# Patient Record
Sex: MALE | Race: BLACK OR AFRICAN AMERICAN | Employment: PART TIME | URBAN - METROPOLITAN AREA
[De-identification: names, ages, dates, MRNs, and addresses within clinical notes are randomized per-mention and may not be internally consistent; named-entity substitution may affect disease eponyms.]

---

## 2018-01-09 ENCOUNTER — HOSPITAL ENCOUNTER (EMERGENCY)
Age: 23
Discharge: HOME | End: 2018-01-09
Attending: EMERGENCY MEDICINE
Payer: SELF-PAY

## 2018-01-09 ENCOUNTER — APPOINTMENT (OUTPATIENT)
Dept: GENERAL RADIOLOGY | Age: 23
End: 2018-01-09
Attending: EMERGENCY MEDICINE
Payer: SELF-PAY

## 2018-01-09 VITALS
RESPIRATION RATE: 22 BRPM | OXYGEN SATURATION: 96 % | BODY MASS INDEX: 36.91 KG/M2 | SYSTOLIC BLOOD PRESSURE: 140 MMHG | HEART RATE: 115 BPM | DIASTOLIC BLOOD PRESSURE: 75 MMHG | WEIGHT: 263.67 LBS | HEIGHT: 71 IN | TEMPERATURE: 103.1 F

## 2018-01-09 DIAGNOSIS — J02.9 ACUTE PHARYNGITIS, UNSPECIFIED ETIOLOGY: Primary | ICD-10-CM

## 2018-01-09 DIAGNOSIS — R53.83 MALAISE AND FATIGUE: ICD-10-CM

## 2018-01-09 DIAGNOSIS — R53.81 MALAISE AND FATIGUE: ICD-10-CM

## 2018-01-09 LAB
ALBUMIN SERPL-MCNC: 3.9 G/DL (ref 3.5–5)
ALBUMIN/GLOB SERPL: 0.8 {RATIO} (ref 1.1–2.2)
ALP SERPL-CCNC: 73 U/L (ref 45–117)
ALT SERPL-CCNC: 43 U/L (ref 12–78)
ANION GAP SERPL CALC-SCNC: 5 MMOL/L (ref 5–15)
APPEARANCE UR: ABNORMAL
AST SERPL-CCNC: 23 U/L (ref 15–37)
BACTERIA URNS QL MICRO: NEGATIVE /HPF
BASOPHILS # BLD: 0 K/UL
BASOPHILS NFR BLD: 0 %
BILIRUB SERPL-MCNC: 0.6 MG/DL (ref 0.2–1)
BILIRUB UR QL CFM: NEGATIVE
BUN SERPL-MCNC: 12 MG/DL (ref 6–20)
BUN/CREAT SERPL: 8 (ref 12–20)
CALCIUM SERPL-MCNC: 9 MG/DL (ref 8.5–10.1)
CHLORIDE SERPL-SCNC: 99 MMOL/L (ref 97–108)
CO2 SERPL-SCNC: 30 MMOL/L (ref 21–32)
COLOR UR: ABNORMAL
CREAT SERPL-MCNC: 1.47 MG/DL (ref 0.7–1.3)
DEPRECATED S PYO AG THROAT QL EIA: NEGATIVE
DIFFERENTIAL METHOD BLD: ABNORMAL
EOSINOPHIL # BLD: 0 K/UL
EOSINOPHIL NFR BLD: 0 %
EPITH CASTS URNS QL MICRO: ABNORMAL /LPF
ERYTHROCYTE [DISTWIDTH] IN BLOOD BY AUTOMATED COUNT: 13.8 % (ref 11.5–14.5)
FLUAV AG NPH QL IA: NEGATIVE
FLUBV AG NOSE QL IA: NEGATIVE
GLOBULIN SER CALC-MCNC: 4.8 G/DL (ref 2–4)
GLUCOSE SERPL-MCNC: 97 MG/DL (ref 65–100)
GLUCOSE UR STRIP.AUTO-MCNC: NEGATIVE MG/DL
HCT VFR BLD AUTO: 46.2 % (ref 36.6–50.3)
HETEROPH AB SER QL: NEGATIVE
HGB BLD-MCNC: 15.7 G/DL (ref 12.1–17)
HGB UR QL STRIP: ABNORMAL
HYALINE CASTS URNS QL MICRO: ABNORMAL /LPF (ref 0–5)
KETONES UR QL STRIP.AUTO: ABNORMAL MG/DL
LACTATE SERPL-SCNC: 1.6 MMOL/L (ref 0.4–2)
LEUKOCYTE ESTERASE UR QL STRIP.AUTO: NEGATIVE
LYMPHOCYTES # BLD: 1.1 K/UL
LYMPHOCYTES NFR BLD: 8 %
MCH RBC QN AUTO: 31.8 PG (ref 26–34)
MCHC RBC AUTO-ENTMCNC: 34 G/DL (ref 30–36.5)
MCV RBC AUTO: 93.5 FL (ref 80–99)
MONOCYTES # BLD: 2.4 K/UL
MONOCYTES NFR BLD: 17 %
NEUTS BAND NFR BLD MANUAL: 4 %
NEUTS SEG # BLD: 10.6 K/UL
NEUTS SEG NFR BLD: 71 %
NITRITE UR QL STRIP.AUTO: NEGATIVE
PATH REV BLD -IMP: NORMAL
PH UR STRIP: 6 [PH] (ref 5–8)
PLATELET # BLD AUTO: 176 K/UL (ref 150–400)
POTASSIUM SERPL-SCNC: 3.4 MMOL/L (ref 3.5–5.1)
PROT SERPL-MCNC: 8.7 G/DL (ref 6.4–8.2)
PROT UR STRIP-MCNC: 30 MG/DL
RBC # BLD AUTO: 4.94 M/UL (ref 4.1–5.7)
RBC #/AREA URNS HPF: ABNORMAL /HPF (ref 0–5)
RBC MORPH BLD: ABNORMAL
SODIUM SERPL-SCNC: 134 MMOL/L (ref 136–145)
SP GR UR REFRACTOMETRY: 1.03 (ref 1–1.03)
UA: UC IF INDICATED,UAUC: ABNORMAL
UROBILINOGEN UR QL STRIP.AUTO: 2 EU/DL (ref 0.2–1)
WBC # BLD AUTO: 14.1 K/UL (ref 4.1–11.1)
WBC MORPH BLD: ABNORMAL
WBC URNS QL MICRO: ABNORMAL /HPF (ref 0–4)

## 2018-01-09 PROCEDURE — 80053 COMPREHEN METABOLIC PANEL: CPT | Performed by: EMERGENCY MEDICINE

## 2018-01-09 PROCEDURE — 36415 COLL VENOUS BLD VENIPUNCTURE: CPT | Performed by: EMERGENCY MEDICINE

## 2018-01-09 PROCEDURE — 71045 X-RAY EXAM CHEST 1 VIEW: CPT

## 2018-01-09 PROCEDURE — 74011250637 HC RX REV CODE- 250/637: Performed by: EMERGENCY MEDICINE

## 2018-01-09 PROCEDURE — 83605 ASSAY OF LACTIC ACID: CPT | Performed by: EMERGENCY MEDICINE

## 2018-01-09 PROCEDURE — 99283 EMERGENCY DEPT VISIT LOW MDM: CPT

## 2018-01-09 PROCEDURE — 87147 CULTURE TYPE IMMUNOLOGIC: CPT | Performed by: EMERGENCY MEDICINE

## 2018-01-09 PROCEDURE — 87804 INFLUENZA ASSAY W/OPTIC: CPT | Performed by: EMERGENCY MEDICINE

## 2018-01-09 PROCEDURE — 87040 BLOOD CULTURE FOR BACTERIA: CPT | Performed by: EMERGENCY MEDICINE

## 2018-01-09 PROCEDURE — 87070 CULTURE OTHR SPECIMN AEROBIC: CPT | Performed by: EMERGENCY MEDICINE

## 2018-01-09 PROCEDURE — 74011000250 HC RX REV CODE- 250: Performed by: EMERGENCY MEDICINE

## 2018-01-09 PROCEDURE — 87880 STREP A ASSAY W/OPTIC: CPT | Performed by: EMERGENCY MEDICINE

## 2018-01-09 PROCEDURE — 86308 HETEROPHILE ANTIBODY SCREEN: CPT | Performed by: EMERGENCY MEDICINE

## 2018-01-09 PROCEDURE — 81001 URINALYSIS AUTO W/SCOPE: CPT | Performed by: EMERGENCY MEDICINE

## 2018-01-09 PROCEDURE — 85025 COMPLETE CBC W/AUTO DIFF WBC: CPT | Performed by: EMERGENCY MEDICINE

## 2018-01-09 RX ORDER — AZITHROMYCIN 250 MG/1
TABLET, FILM COATED ORAL
Qty: 6 TAB | Refills: 0 | Status: SHIPPED | OUTPATIENT
Start: 2018-01-09 | End: 2018-06-10

## 2018-01-09 RX ORDER — LIDOCAINE HYDROCHLORIDE 20 MG/ML
15 SOLUTION OROPHARYNGEAL
Status: COMPLETED | OUTPATIENT
Start: 2018-01-09 | End: 2018-01-09

## 2018-01-09 RX ORDER — DEXAMETHASONE SODIUM PHOSPHATE 4 MG/ML
10 INJECTION, SOLUTION INTRA-ARTICULAR; INTRALESIONAL; INTRAMUSCULAR; INTRAVENOUS; SOFT TISSUE
Status: COMPLETED | OUTPATIENT
Start: 2018-01-09 | End: 2018-01-09

## 2018-01-09 RX ORDER — SODIUM CHLORIDE 0.9 % (FLUSH) 0.9 %
5-10 SYRINGE (ML) INJECTION AS NEEDED
Status: DISCONTINUED | OUTPATIENT
Start: 2018-01-09 | End: 2018-01-09 | Stop reason: HOSPADM

## 2018-01-09 RX ADMIN — DEXAMETHASONE SODIUM PHOSPHATE 10 MG: 4 INJECTION, SOLUTION INTRAMUSCULAR; INTRAVENOUS at 05:22

## 2018-01-09 RX ADMIN — LIDOCAINE HYDROCHLORIDE 15 ML: 20 SOLUTION ORAL; TOPICAL at 05:22

## 2018-01-09 NOTE — Clinical Note
[facility] [address] [place] [address] Physicians [phone] MD Vicki Khan MD  
 
[facility] [phone] Mahendra Browning, [facility] Link Kunz, MD Nagi Holden, MD Veda Mejia MD  
 
[facility] Alber Gutierrez [facility] [phone] MD Karley Del Valle MD 
 
[facility] [phone] MD Geovanni Eli, MD Dex Boo, MD Ambrose Tarango MD  
 
[facility] [phone] Terrell Arce, MD Sabine Elam, MD Ian Olivas, NP [address] [phone] Norvell Kanner, MD Alcario Crumble, MD Marcelo Davenport, MD Tricia Mon, MD Mikie Dennis, MD Dsouza [facility], [address] [address] [phone] Sera Brothers MD 
 
[facility] [phone] MD Reyna Velasco, NP Cindy Sanchez, MD Leocadia Lundborg, MD Darius Donahue, MD Sanya Godinez, MD Mayo [address] [place] [address] [phone] MD Malu Moreno, P Gabriela Clifton, NP  
Yulissa Christine, MD Boogie Mcfadden MD Trilby Narrow, MD [facility] [phone] MD Isac Morgan,  MD Katie Velasquez MD Molli Banas, MD Carlo Flash, MD  
 
[facility] [phone] MD Jose M Ramsey MD 
 
 [facility] [phone] MD Yasmin Duque, ROSA MARIA Mosqueda [facility] [phone] MD Fabien Amaya, MD Frankie Josue, MD Sonali Arceo, NP Tracy Sandoval MD  
 
[address] [phone] Shellie Moya gabby, MD Jose Yun MD [facility] [phone] [address] [place] [address], MD Chiquita Diallo, GORDO Garza, GORDO Holloway MD Eletha March, GAIL Crespo,  Miscellaneous:  
 
[facility] Departments For adult and child immunizations, family planning, TB screening, STD testing and women's health services. [place]: [place] [phone] 2 [facility] [phone] [facility]   [phone] [place]   [phone] 304 [facility]   [phone] [place]: [facility] [phone] [facility] [phone] Butch Larry [phone] General Health  [facility] For primary care services, woman and child wellness, and some clinics providing specialty care. [facility] -- [facility]. [address] [place] [address] [phone] [facility] 200 [facility] [phone] [facility]4 [address] [phone] [address] [place] [address] Drive 5850  Community  [phone] 7700 [facility] [phone] [address] [phone] Shemar [facility] [place]  [address], 809 [facility]: [facility] SYSTEM [address], ext 320 1509 [facility], [address] [phone] [facility] 37 [phone] [address] 5850  Community  [phone] [employer] [employer]  [address], [phone] 3550 [address] [employer] [address] ([email or web address]/about/mission. asp) [phone] Sexual Health/Woman Wellness Clinics For STD/HIV testing and treatment, pregnancy testing and services, men's health, birth control services, LGBT services, and hepatitis/HPV vaccine services. [employer] for [place] [employer] [address] [phone] [facility] of [address] 301 Julius  [phone] [address] 301 Julius Martinez [phone] 95 Rachel Higuera, 5th floor [phone] [facility] of 1065 [facility] 427 [facility] [address] [facility] [address]. [facility] [phone] Specialty Service Clinics 200 [facility] [phone] 6655 [facility]   [phone] [place]   [phone] Women, Infant and Children's Services: [facility] [phone] [address] [phone] [facility]   [phone] [address] [facility]     [phone] 1325 [facility]   [phone] 2 [facility] [phone] Thank you for allowing us to provide you with excellent care t alejandro. We hope we addressed all of your concerns and needs. We strive to provide excellent quality care in the Emergency Department. Please rate us as excellent, as anything less than excellent does not meet our expectations. If you feel that you have  not received excellent quality care or timely care, please ask to speak to the nurse manager. Please choose us in the future for your continued health care needs. The exam and treatment you received in the Emergency Department were for an urgent  problem and are not intended as complete care. It is important that you follow up with a doctor, nurse practitioner, or physician assistant for ongoing care.  If your symptoms become worse or you do not improve as expected and you are unable to reach you r usual health care provider, you should return to the Emergency Department. We are available 24 hours a day. Take this sheet with you when you go to your follow-up visit. If you have any problem arranging the follow-up visit, contact the Emergency D epartment immediately. If a prescription has been provided, please have it filled as soon as possible to avoid a delay in treatment. Read the entire medication instruction sheet provided to you by the pharmacy. If you have any questions or reservat ions about taking the medication due to side effects or interactions with other medications please call the ER or your primary care physician. Take this sheet with you when you go to your follow-up visit. Make an appointment with your family doct or or the physician you were referred to for follow-up of this visit, as this is mandatory follow-up. Return to the ER if you are unable to be seen or if you are unable to be seen in a timely manner. If you have any problem arranging the follow-up vi sit, contact the Emergency Department immediately.

## 2018-01-09 NOTE — LETTER
[address] 
[facility] EMERGENCY DEPT 
[address] [place] [address] [address] [id number] [phone] Work/School Note Date: 1/9/2018 To Whom It May concern: 
 
Kiran Velarde was seen and treated today in the emergency room by the following provider(s): 
Attending Provider: Elroy Hammans, MD. Kiran Velarde may return to work on 1/10/18.  
 
Sincerely, 
 
 
 
 
Elroy Hammans, MD

## 2018-01-09 NOTE — DISCHARGE INSTRUCTIONS
Sore Throat: Care Instructions  Your Care Instructions    Infection by bacteria or a virus causes most sore throats. Cigarette smoke, dry air, air pollution, allergies, and yelling can also cause a sore throat. Sore throats can be painful and annoying. Fortunately, most sore throats go away on their own. If you have a bacterial infection, your doctor may prescribe antibiotics. Follow-up care is a key part of your treatment and safety. Be sure to make and go to all appointments, and call your doctor if you are having problems. It's also a good idea to know your test results and keep a list of the medicines you take. How can you care for yourself at home? · If your doctor prescribed antibiotics, take them as directed. Do not stop taking them just because you feel better. You need to take the full course of antibiotics. · Gargle with warm salt water once an hour to help reduce swelling and relieve discomfort. Use 1 teaspoon of salt mixed in 1 cup of warm water. · Take an over-the-counter pain medicine, such as acetaminophen (Tylenol), ibuprofen (Advil, Motrin), or naproxen (Aleve). Read and follow all instructions on the label. · Be careful when taking over-the-counter cold or flu medicines and Tylenol at the same time. Many of these medicines have acetaminophen, which is Tylenol. Read the labels to make sure that you are not taking more than the recommended dose. Too much acetaminophen (Tylenol) can be harmful. · Drink plenty of fluids. Fluids may help soothe an irritated throat. Hot fluids, such as tea or soup, may help decrease throat pain. · Use over-the-counter throat lozenges to soothe pain. Regular cough drops or hard candy may also help. These should not be given to young children because of the risk of choking. · Do not smoke or allow others to smoke around you. If you need help quitting, talk to your doctor about stop-smoking programs and medicines.  These can increase your chances of quitting for good. · Use a vaporizer or humidifier to add moisture to your bedroom. Follow the directions for cleaning the machine. When should you call for help? Call your doctor now or seek immediate medical care if:  ? · You have new or worse trouble swallowing. ? · Your sore throat gets much worse on one side. ? Watch closely for changes in your health, and be sure to contact your doctor if you do not get better as expected. Where can you learn more? Go to http://solis-walt.info/. Enter [id number] [id number] in the search box to learn more about \"Sore Throat: Care Instructions. \"  Current as of: May 12, 2017  Content Version: 11.4  © 8295-5195 [employer]. Care instructions adapted under license by [employer] (which disclaims liability or warranty for this information). If you have questions about a medical condition or this instruction, always ask your healthcare professional. [place] [address] any warranty or liability for your use of this information. Rapid Strep Test: About This Test  What is it? A rapid strep test checks the bacteria in your throat to see if strep is the cause of your sore throat. Why is this test done? It may be done so your doctor can find out right away whether you have strep throat. There is another test for strep, called a throat culture, but that test takes a few days to get the results. How can you prepare for the test?  You don't need to do anything before you have this test.  What happens during the test?  · You will be asked to tilt your head back and open your mouth as wide as possible. · Your doctor will press your tongue down with a flat stick (tongue depressor) and then examine your mouth and throat. · A clean cotton swab will be rubbed over the back of your throat, around your tonsils, and over any red areas or sores to collect a sample. How long does the test take? · The test takes less than a minute.   · Results are available in 10 to 15 minutes. Follow-up care is a key part of your treatment and safety. Be sure to make and go to all appointments, and call your doctor if you are having problems. It's also a good idea to keep a list of the medicines you take. Ask your doctor when you can expect to have your test results. Where can you learn more? Go to http://solis-walt.info/. Enter B356 in the search box to learn more about \"Rapid Strep Test: About This Test.\"  Current as of: May 12, 2017  Content Version: 11.4  © 5905-5810 [employer]. Care instructions adapted under license by [employer] (which disclaims liability or warranty for this information). If you have questions about a medical condition or this instruction, always ask your healthcare professional. [address] any warranty or liability for your use of this information. Fatigue: Care Instructions  Your Care Instructions    Fatigue is a feeling of tiredness, exhaustion, or lack of energy. You may feel fatigue because of too much or not enough activity. It can also come from stress, lack of sleep, boredom, and poor diet. Many medical problems, such as viral infections, can cause fatigue. Emotional problems, especially depression, are often the cause of fatigue. Fatigue is most often a symptom of another problem. Treatment for fatigue depends on the cause. For example, if you have fatigue because you have a certain health problem, treating this problem also treats your fatigue. If depression or anxiety is the cause, treatment may help. Follow-up care is a key part of your treatment and safety. Be sure to make and go to all appointments, and call your doctor if you are having problems. It's also a good idea to know your test results and keep a list of the medicines you take. How can you care for yourself at home? · Get regular exercise. But don't overdo it.  Go back and forth between rest and exercise. · Get plenty of rest.  · Eat a healthy diet. Do not skip meals, especially breakfast.  · Reduce your use of caffeine, tobacco, and alcohol. Caffeine is most often found in coffee, tea, cola drinks, and chocolate. · Limit medicines that can cause fatigue. This includes tranquilizers and cold and allergy medicines. When should you call for help? Watch closely for changes in your health, and be sure to contact your doctor if:  ? · You have new symptoms such as fever or a rash. ? · Your fatigue gets worse. ? · You have been feeling down, depressed, or hopeless. Or you may have lost interest in things that you usually enjoy. ? · You are not getting better as expected. Where can you learn more? Go to http://solisCognitive Codewalt.info/. Enter F081 in the search box to learn more about \"Fatigue: Care Instructions. \"  Current as of: March 20, 2017  Content Version: 11.4  © 3765-7626 [employer]. Care instructions adapted under license by [employer] (which disclaims liability or warranty for this information). If you have questions about a medical condition or this instruction, always ask your healthcare professional. [place] [address] any warranty or liability for your use of this information. Weakness: Care Instructions  Your Care Instructions    Weakness is a lack of physical or muscle strength. You may feel that you need to make extra effort to move your arms, legs, or other muscles. Generalized weakness means that you feel weak in most areas of your body. Another type of weakness may affect just one muscle or group of muscles. You may feel weak and tired after you have done too much activity, such as taking an extra-long hike. This is not a serious problem. It often goes away on its own. Feeling weak can also be caused by medical conditions like thyroid problems, depression, or a virus. Sometimes the cause can be serious.  Your doctor may want to do more tests to try to find the cause of the weakness. The doctor has checked you carefully, but problems can develop later. If you notice any problems or new symptoms, get medical treatment right away. Follow-up care is a key part of your treatment and safety. Be sure to make and go to all appointments, and call your doctor if you are having problems. It's also a good idea to know your test results and keep a list of the medicines you take. How can you care for yourself at home? · Rest when you feel tired. · Be safe with medicines. If your doctor prescribed medicine, take it exactly as prescribed. Call your doctor if you think you are having a problem with your medicine. You will get more details on the specific medicines your doctor prescribes. · Do not skip meals. Eating a balanced diet may increase your energy level. · Get some physical activity every day, but do not get too tired. When should you call for help? Call your doctor now or seek immediate medical care if:  ? · You have new or worse weakness. ? · You are dizzy or lightheaded, or you feel like you may faint. ? Watch closely for changes in your health, and be sure to contact your doctor if:  ? · You do not get better as expected. Where can you learn more? Go to http://solis-walt.info/. Enter [id number] in the search box to learn more about \"Weakness: Care Instructions. \"  Current as of: March 20, 2017  Content Version: 11.4  © 9734-6611 [employer]. Care instructions adapted under license by [facility] (which disclaims liability or warranty for this information). If you have questions about a medical condition or this instruction, always ask your healthcare professional. [address] any warranty or liability for your use of this information.

## 2018-01-09 NOTE — ED TRIAGE NOTES
Pt presents to the ED not feeling well since Sunday. Pt was unaware he was running a fever. Sore throat, non-productive cough, body aches, chills since Sunday. Pt states, \"I just feel horrible. \"    Pt reports taking tylenol and TheraFlu earlier yesterday for his symptoms.

## 2018-01-09 NOTE — ED NOTES
Discharge instructions reviewed with patient by Dr. Rina Hooper. Pt denies questions or concerns at this time. Pt appears in no acute distress. Pt ambulatory out of department with steady gait.

## 2018-01-09 NOTE — ED PROVIDER NOTES
EMERGENCY DEPARTMENT HISTORY AND PHYSICAL EXAM      Date: 1/9/2018  Patient Name: Angel Us    History of Presenting Illness     Chief Complaint   Patient presents with    Sore Throat     since sunday. self medicating with OTC meds    Fatigue     \"i have been sleeping on and off since sunday evening for all day\"    Chills    Headache       History Provided By: Patient    HPI: Angel Us is a 20 y.o. male, who presents ambulatory to the ED c/o cold sxs x2 days ago. Pt reports a persistent sore throat, generalized body aches with associated chills and a non-productive cough. He notes taking Theraflu and Tylenol at home without relief of sxs. Pt denies receiving a flu shot this year. Pt specifically denies any fever, congestion, shortness of breath, chest pain, abdominal pain, nausea, vomiting, diarrhea, dysuria, or urinary frequency. PCP: None    PMHx: Significant for none  PSHx: Significant for cholecystectomy, appendectomy  Social Hx: +tobacco (.25), -EtOH, -Illicit Drugs     There are no other complaints, changes, or physical findings at this time. Current Facility-Administered Medications   Medication Dose Route Frequency Provider Last Rate Last Dose    sodium chloride (NS) flush 5-10 mL  5-10 mL IntraVENous PRN Yana Clarke MD         Current Outpatient Prescriptions   Medication Sig Dispense Refill    azithromycin (ZITHROMAX Z-SHAHIDA) 250 mg tablet Take 2 tablets on day 1, then 1 tablet days 2-5. 6 Tab 0       Past History     Past Medical History:  History reviewed. No pertinent past medical history. Past Surgical History:  Past Surgical History:   Procedure Laterality Date    HX APPENDECTOMY      HX CHOLECYSTECTOMY         Family History:  History reviewed. No pertinent family history.     Social History:  Social History   Substance Use Topics    Smoking status: Current Every Day Smoker     Packs/day: 0.25    Smokeless tobacco: None    Alcohol use Yes      Comment: socially Allergies:  No Known Allergies      Review of Systems   Review of Systems   Constitutional: Positive for chills. Negative for fever. HENT: Positive for sore throat. Eyes: Negative. Respiratory: Positive for cough. Negative for chest tightness and shortness of breath. Cardiovascular: Negative for chest pain and leg swelling. Gastrointestinal: Negative for abdominal pain, diarrhea, nausea and vomiting. Endocrine: Negative. Genitourinary: Negative for difficulty urinating and dysuria. Musculoskeletal: Positive for myalgias (generalized body aches). Skin: Negative. Neurological: Negative. Psychiatric/Behavioral: Negative. All other systems reviewed and are negative. Physical Exam   Physical Exam   Constitutional: He is oriented to person, place, and time. He appears well-developed and well-nourished. No distress. HENT:   Head: Normocephalic and atraumatic. Nose: Nose normal.   Mouth/Throat: Uvula is midline. No trismus in the jaw. No dental abscesses or uvula swelling. Oropharyngeal exudate and posterior oropharyngeal edema present. Eyes: Conjunctivae and EOM are normal. Pupils are equal, round, and reactive to light. Neck: Normal range of motion. Neck supple. No JVD present. Cardiovascular: Regular rhythm, normal heart sounds and intact distal pulses. Tachycardia present. Exam reveals no friction rub. No murmur heard. Pulmonary/Chest: Effort normal and breath sounds normal. No stridor. No respiratory distress. He has no wheezes. He has no rales. Abdominal: Soft. Bowel sounds are normal. He exhibits no distension. There is no tenderness. There is no rebound. Musculoskeletal: Normal range of motion. He exhibits no tenderness. Neurological: He is alert and oriented to person, place, and time. No cranial nerve deficit. Skin: Skin is warm and dry. No rash noted. He is not diaphoretic. Psychiatric: He has a normal mood and affect.  His speech is normal and behavior is normal. Judgment and thought content normal. Cognition and memory are normal.   Nursing note and vitals reviewed. Diagnostic Study Results     Labs -     Recent Results (from the past 12 hour(s))   STREP AG SCREEN, GROUP A    Collection Time: 01/09/18  3:46 AM   Result Value Ref Range    Group A Strep Ag ID NEGATIVE  NEG     INFLUENZA A & B AG (RAPID TEST)    Collection Time: 01/09/18  3:46 AM   Result Value Ref Range    Influenza A Antigen NEGATIVE  NEG      Influenza B Antigen NEGATIVE  NEG     LACTIC ACID    Collection Time: 01/09/18  3:55 AM   Result Value Ref Range    Lactic acid 1.6 0.4 - 2.0 MMOL/L   METABOLIC PANEL, COMPREHENSIVE    Collection Time: 01/09/18  3:55 AM   Result Value Ref Range    Sodium 134 (L) 136 - 145 mmol/L    Potassium 3.4 (L) 3.5 - 5.1 mmol/L    Chloride 99 97 - 108 mmol/L    CO2 30 21 - 32 mmol/L    Anion gap 5 5 - 15 mmol/L    Glucose 97 65 - 100 mg/dL    BUN 12 6 - 20 MG/DL    Creatinine 1.47 (H) 0.70 - 1.30 MG/DL    BUN/Creatinine ratio 8 (L) 12 - 20      GFR est AA >60 >60 ml/min/1.73m2    GFR est non-AA 59 (L) >60 ml/min/1.73m2    Calcium 9.0 8.5 - 10.1 MG/DL    Bilirubin, total 0.6 0.2 - 1.0 MG/DL    ALT (SGPT) 43 12 - 78 U/L    AST (SGOT) 23 15 - 37 U/L    Alk. phosphatase 73 45 - 117 U/L    Protein, total 8.7 (H) 6.4 - 8.2 g/dL    Albumin 3.9 3.5 - 5.0 g/dL    Globulin 4.8 (H) 2.0 - 4.0 g/dL    A-G Ratio 0.8 (L) 1.1 - 2.2     CBC WITH AUTOMATED DIFF    Collection Time: 01/09/18  3:55 AM   Result Value Ref Range    WBC 14.1 (H) 4.1 - 11.1 K/uL    RBC 4.94 4.10 - 5.70 M/uL    HGB 15.7 12.1 - 17.0 g/dL    HCT 46.2 36.6 - 50.3 %    MCV 93.5 80.0 - 99.0 FL    MCH 31.8 26.0 - 34.0 PG    MCHC 34.0 30.0 - 36.5 g/dL    RDW 13.8 11.5 - 14.5 %    PLATELET 517 011 - 105 K/uL    NEUTROPHILS 71 %    BAND NEUTROPHILS 4 %    LYMPHOCYTES 8 %    MONOCYTES 17 %    EOSINOPHILS 0 %    BASOPHILS 0 %    ABS. NEUTROPHILS 10.6 K/UL    ABS. LYMPHOCYTES 1.1 K/UL    ABS.  MONOCYTES 2.4 K/UL    ABS. EOSINOPHILS 0.0 K/UL    ABS. BASOPHILS 0.0 K/UL    RBC COMMENTS NORMOCYTIC, NORMOCHROMIC      WBC COMMENTS SMEAR FOR PATHOLOGIST REVIEW      DF MANUAL     MONONUCLEOSIS SCREEN    Collection Time: 01/09/18  3:55 AM   Result Value Ref Range    Mononucleosis screen NEGATIVE  NEG     URINALYSIS W/ REFLEX CULTURE    Collection Time: 01/09/18  5:28 AM   Result Value Ref Range    Color DARK YELLOW      Appearance TURBID (A) CLEAR      Specific gravity 1.028 1.003 - 1.030      pH (UA) 6.0 5.0 - 8.0      Protein 30 (A) NEG mg/dL    Glucose NEGATIVE  NEG mg/dL    Ketone TRACE (A) NEG mg/dL    Blood TRACE (A) NEG      Urobilinogen 2.0 (H) 0.2 - 1.0 EU/dL    Nitrites NEGATIVE  NEG      Leukocyte Esterase NEGATIVE  NEG      WBC 0-4 0 - 4 /hpf    RBC 0-5 0 - 5 /hpf    Epithelial cells FEW FEW /lpf    Bacteria NEGATIVE  NEG /hpf    UA:UC IF INDICATED CULTURE NOT INDICATED BY UA RESULT CNI      Hyaline cast 0-2 0 - 5 /lpf   BILIRUBIN, CONFIRM    Collection Time: 01/09/18  5:28 AM   Result Value Ref Range    Bilirubin UA, confirm NEGATIVE  NEG         Radiologic Studies -     CXR Results  (Last 48 hours)               01/09/18 0432  XR CHEST PORT Final result    Impression:  IMPRESSION:       No acute process. Narrative:  EXAM:  XR CHEST PORT       INDICATION:  Sepsis. Headache with chills and fatigue since Sunday. COMPARISON: None. TECHNIQUE: Portable AP upright chest view at 0417 hours       FINDINGS: The cardiomediastinal and hilar contours are within normal limits. The   pulmonary vasculature is within normal limits. The lungs and pleural spaces are clear. There is no pneumothorax. The visualized   bones and upper abdomen are age-appropriate. Medical Decision Making   I am the first provider for this patient. I reviewed the vital signs, available nursing notes, past medical history, past surgical history, family history and social history.     Vital Signs-Reviewed the patient's vital signs. Patient Vitals for the past 12 hrs:   Temp Pulse Resp BP SpO2   01/09/18 0535 (!) 103.1 °F (39.5 °C) - - - -   01/09/18 0530 - - - - 96 %   01/09/18 0528 - - - 139/86 -   01/09/18 0333 (!) 103 °F (39.4 °C) (!) 115 22 (!) 150/104 100 %       Pulse Oximetry Analysis - 100% on RA    Cardiac Monitor:   Rate: 115 bpm  Rhythm: Sinus Tachycardia      Records Reviewed: Nursing Notes and Old Medical Records    Provider Notes (Medical Decision Making):     DDX:  Strep, flu, mono, dehydration, pna, viral illness    Plan:  Strep and flu swabs, ivf, labs, cxr    Impression:  Acute pharyngitis    ED Course:   Initial assessment performed. The patients presenting problems have been discussed, and they are in agreement with the care plan formulated and outlined with them. I have encouraged them to ask questions as they arise throughout their visit. I reviewed our electronic medical record system for any past medical records that were available that may contribute to the patients current condition, the nursing notes and and vital signs from today's visit    Nursing notes will be reviewed as they become available in realtime while the pt has been in the ED. Caitlyn Sharpe MD    I have spent 3-7 minutes discussing the medical risks of prolonged smoking habits and advised the patient of the benefits of the cessation of smoking, providing specific suggestions on how to quit. Caitlyn Sharpe MD    I personally reviewed pt's imaging. Official read by radiology listed below. Caitlyn Sharpe MD    PROGRESS NOTE:  3:33 AM  Code purple called at this time. PROGRESS NOTE:  5:10 AM  Pt reevaluated. Reviewed neg lactate, mono, flu, strep and cxr results, with elevated WBC. Will order UA and continue to monitor. 5:59 AM  Progress note:  Pt noted to be feeling better, ready for discharge. Discussed lab and imaging findings with pt, specifically noting neg mono, flu, strep and cxr.  Pt will follow up as instructed. All questions have been answered, pt voiced understanding and agreement with plan. If narcotics were prescribed, pt was advised not to drive or operate heavy machinery. If abx were prescribed, pt advised that diarrhea and rash are possible side effects of the medications. Specific return precautions provided in addition to instructions for pt to return to the ED immediately should sx worsen at any time. Vivien Bañuelos MD      PLAN:  1. Current Discharge Medication List      START taking these medications    Details   azithromycin (ZITHROMAX Z-SHAHIDA) 250 mg tablet Take 2 tablets on day 1, then 1 tablet days 2-5. Qty: 6 Tab, Refills: 0           2. Follow-up Information     Follow up With Details Comments Contact Info    [facility] EMERGENCY DEPT  As needed [address] [place] [address]  [phone]        Return to ED if worse     Disposition:    6:02 AM   The patient's results have been reviewed with family and/or caregiver. They verbally convey their understanding and agreement of the patient's signs, symptoms, diagnosis, treatment and prognosis and additionally agree to follow up as recommended in the discharge instructions or to return to the Emergency Room should the patient's condition change prior to their follow-up appointment. The family and/or caregiver verbally agrees with the care-plan and all of their questions have been answered. The discharge instructions have also been provided to the them with educational information regarding the patient's diagnosis as well a list of reasons why the patient would want to return to the ER prior to their follow-up appointment should their condition change. Vivien Bañuelos MD      Diagnosis     Clinical Impression:   1. Acute pharyngitis, unspecified etiology    2. Malaise and fatigue        Attestations:     This note is prepared by Sarah Reno, acting as Scribe for MD Vivien Simpson MD : The scribe's documentation has been prepared under my direction and personally reviewed by me in its entirety. I confirm that the note above accurately reflects all work, treatment, procedures, and medical decision making performed by me. This note will not be viewable in [address]

## 2018-01-11 LAB
BACTERIA SPEC CULT: ABNORMAL
BACTERIA SPEC CULT: ABNORMAL
SERVICE CMNT-IMP: ABNORMAL

## 2018-01-15 LAB
BACTERIA SPEC CULT: NORMAL
BACTERIA SPEC CULT: NORMAL
SERVICE CMNT-IMP: NORMAL
SERVICE CMNT-IMP: NORMAL

## 2018-06-07 ENCOUNTER — HOSPITAL ENCOUNTER (EMERGENCY)
Age: 23
Discharge: HOME | End: 2018-06-07
Attending: EMERGENCY MEDICINE
Payer: SELF-PAY

## 2018-06-07 VITALS
HEIGHT: 71 IN | SYSTOLIC BLOOD PRESSURE: 137 MMHG | TEMPERATURE: 98.4 F | HEART RATE: 118 BPM | OXYGEN SATURATION: 98 % | RESPIRATION RATE: 16 BRPM | BODY MASS INDEX: 35.31 KG/M2 | DIASTOLIC BLOOD PRESSURE: 86 MMHG | WEIGHT: 252.21 LBS

## 2018-06-07 DIAGNOSIS — R19.7 DIARRHEA, UNSPECIFIED TYPE: Primary | ICD-10-CM

## 2018-06-07 LAB
AMPHET UR QL SCN: NEGATIVE
ANION GAP SERPL CALC-SCNC: 5 MMOL/L (ref 5–15)
APPEARANCE UR: CLEAR
BACTERIA URNS QL MICRO: NEGATIVE /HPF
BARBITURATES UR QL SCN: NEGATIVE
BASOPHILS # BLD: 0 K/UL (ref 0–0.1)
BASOPHILS NFR BLD: 0 % (ref 0–1)
BENZODIAZ UR QL: NEGATIVE
BILIRUB UR QL: NEGATIVE
BUN SERPL-MCNC: 10 MG/DL (ref 6–20)
BUN/CREAT SERPL: 7 (ref 12–20)
C DIFF TOX GENS STL QL NAA+PROBE: NEGATIVE
CALCIUM SERPL-MCNC: 9.2 MG/DL (ref 8.5–10.1)
CANNABINOIDS UR QL SCN: NEGATIVE
CHLORIDE SERPL-SCNC: 103 MMOL/L (ref 97–108)
CO2 SERPL-SCNC: 29 MMOL/L (ref 21–32)
COCAINE UR QL SCN: NEGATIVE
COLOR UR: ABNORMAL
CREAT SERPL-MCNC: 1.48 MG/DL (ref 0.7–1.3)
DIFFERENTIAL METHOD BLD: ABNORMAL
DRUG SCRN COMMENT,DRGCM: NORMAL
EOSINOPHIL # BLD: 0 K/UL (ref 0–0.4)
EOSINOPHIL NFR BLD: 0 % (ref 0–7)
EPITH CASTS URNS QL MICRO: ABNORMAL /LPF
ERYTHROCYTE [DISTWIDTH] IN BLOOD BY AUTOMATED COUNT: 12.9 % (ref 11.5–14.5)
GLUCOSE SERPL-MCNC: 80 MG/DL (ref 65–100)
GLUCOSE UR STRIP.AUTO-MCNC: NEGATIVE MG/DL
HCT VFR BLD AUTO: 45 % (ref 36.6–50.3)
HGB BLD-MCNC: 15.6 G/DL (ref 12.1–17)
HGB UR QL STRIP: ABNORMAL
HYALINE CASTS URNS QL MICRO: ABNORMAL /LPF (ref 0–5)
IMM GRANULOCYTES # BLD: 0 K/UL (ref 0–0.04)
IMM GRANULOCYTES NFR BLD AUTO: 0 % (ref 0–0.5)
KETONES UR QL STRIP.AUTO: NEGATIVE MG/DL
LEUKOCYTE ESTERASE UR QL STRIP.AUTO: NEGATIVE
LYMPHOCYTES # BLD: 1 K/UL (ref 0.8–3.5)
LYMPHOCYTES NFR BLD: 13 % (ref 12–49)
MAGNESIUM SERPL-MCNC: 1.8 MG/DL (ref 1.6–2.4)
MCH RBC QN AUTO: 32.3 PG (ref 26–34)
MCHC RBC AUTO-ENTMCNC: 34.7 G/DL (ref 30–36.5)
MCV RBC AUTO: 93.2 FL (ref 80–99)
METHADONE UR QL: NEGATIVE
MONOCYTES # BLD: 1.2 K/UL (ref 0–1)
MONOCYTES NFR BLD: 15 % (ref 5–13)
NEUTS SEG # BLD: 5.9 K/UL (ref 1.8–8)
NEUTS SEG NFR BLD: 72 % (ref 32–75)
NITRITE UR QL STRIP.AUTO: NEGATIVE
NRBC # BLD: 0 K/UL (ref 0–0.01)
NRBC BLD-RTO: 0 PER 100 WBC
OPIATES UR QL: NEGATIVE
PCP UR QL: NEGATIVE
PH UR STRIP: 5.5 [PH] (ref 5–8)
PLATELET # BLD AUTO: 191 K/UL (ref 150–400)
PMV BLD AUTO: 10.4 FL (ref 8.9–12.9)
POTASSIUM SERPL-SCNC: 3.6 MMOL/L (ref 3.5–5.1)
PROT UR STRIP-MCNC: NEGATIVE MG/DL
RBC # BLD AUTO: 4.83 M/UL (ref 4.1–5.7)
RBC #/AREA URNS HPF: ABNORMAL /HPF (ref 0–5)
SODIUM SERPL-SCNC: 137 MMOL/L (ref 136–145)
SP GR UR REFRACTOMETRY: 1.02 (ref 1–1.03)
UA: UC IF INDICATED,UAUC: ABNORMAL
UROBILINOGEN UR QL STRIP.AUTO: 0.2 EU/DL (ref 0.2–1)
WBC # BLD AUTO: 8.2 K/UL (ref 4.1–11.1)
WBC #/AREA STL HPF: NORMAL /HPF (ref 0–4)
WBC URNS QL MICRO: ABNORMAL /HPF (ref 0–4)

## 2018-06-07 PROCEDURE — 36415 COLL VENOUS BLD VENIPUNCTURE: CPT | Performed by: EMERGENCY MEDICINE

## 2018-06-07 PROCEDURE — 80048 BASIC METABOLIC PNL TOTAL CA: CPT | Performed by: EMERGENCY MEDICINE

## 2018-06-07 PROCEDURE — 87077 CULTURE AEROBIC IDENTIFY: CPT | Performed by: EMERGENCY MEDICINE

## 2018-06-07 PROCEDURE — 87493 C DIFF AMPLIFIED PROBE: CPT | Performed by: EMERGENCY MEDICINE

## 2018-06-07 PROCEDURE — 87177 OVA AND PARASITES SMEARS: CPT | Performed by: EMERGENCY MEDICINE

## 2018-06-07 PROCEDURE — 87045 FECES CULTURE AEROBIC BACT: CPT | Performed by: EMERGENCY MEDICINE

## 2018-06-07 PROCEDURE — 96361 HYDRATE IV INFUSION ADD-ON: CPT

## 2018-06-07 PROCEDURE — 85025 COMPLETE CBC W/AUTO DIFF WBC: CPT | Performed by: EMERGENCY MEDICINE

## 2018-06-07 PROCEDURE — 74011250636 HC RX REV CODE- 250/636: Performed by: EMERGENCY MEDICINE

## 2018-06-07 PROCEDURE — 89055 LEUKOCYTE ASSESSMENT FECAL: CPT | Performed by: EMERGENCY MEDICINE

## 2018-06-07 PROCEDURE — 83735 ASSAY OF MAGNESIUM: CPT | Performed by: EMERGENCY MEDICINE

## 2018-06-07 PROCEDURE — 96360 HYDRATION IV INFUSION INIT: CPT

## 2018-06-07 PROCEDURE — 87186 SC STD MICRODIL/AGAR DIL: CPT | Performed by: EMERGENCY MEDICINE

## 2018-06-07 PROCEDURE — 74011250637 HC RX REV CODE- 250/637: Performed by: EMERGENCY MEDICINE

## 2018-06-07 PROCEDURE — 81001 URINALYSIS AUTO W/SCOPE: CPT | Performed by: EMERGENCY MEDICINE

## 2018-06-07 PROCEDURE — 99283 EMERGENCY DEPT VISIT LOW MDM: CPT

## 2018-06-07 PROCEDURE — 80307 DRUG TEST PRSMV CHEM ANLYZR: CPT | Performed by: EMERGENCY MEDICINE

## 2018-06-07 RX ORDER — ACETAMINOPHEN 500 MG
1000 TABLET ORAL ONCE
Status: COMPLETED | OUTPATIENT
Start: 2018-06-07 | End: 2018-06-07

## 2018-06-07 RX ORDER — LOPERAMIDE HYDROCHLORIDE 2 MG/1
2 CAPSULE ORAL
Status: COMPLETED | OUTPATIENT
Start: 2018-06-07 | End: 2018-06-07

## 2018-06-07 RX ADMIN — LOPERAMIDE HYDROCHLORIDE 2 MG: 2 CAPSULE ORAL at 07:39

## 2018-06-07 RX ADMIN — ACETAMINOPHEN 1000 MG: 500 TABLET, FILM COATED ORAL at 07:39

## 2018-06-07 RX ADMIN — SODIUM CHLORIDE 1000 ML: 900 INJECTION, SOLUTION INTRAVENOUS at 04:40

## 2018-06-07 NOTE — DISCHARGE INSTRUCTIONS
Diarrhea: Care Instructions  Your Care Instructions  Diarrhea is loose, watery stools (bowel movements). The exact cause is often hard to find. Sometimes diarrhea is your body's way of getting rid of what caused an upset stomach. Viruses, food poisoning, and many medicines can cause diarrhea. Some people get diarrhea in response to emotional stress, anxiety, or certain foods. Almost everyone has diarrhea now and then. It usually isn't serious, and your stools will return to normal soon. The important thing to do is replace the fluids you have lost, so you can prevent dehydration. The doctor has checked you carefully, but problems can develop later. If you notice any problems or new symptoms, get medical treatment right away. Follow-up care is a key part of your treatment and safety. Be sure to make and go to all appointments, and call your doctor if you are having problems. It's also a good idea to know your test results and keep a list of the medicines you take. How can you care for yourself at home? · Watch for signs of dehydration, which means your body has lost too much water. Dehydration is a serious condition and should be treated right away. Signs of dehydration are:  ¨ Increasing thirst and dry eyes and mouth. ¨ Feeling faint or lightheaded. ¨ Darker urine, and a smaller amount of urine than normal.  · To prevent dehydration, drink plenty of fluids, enough so that your urine is light yellow or clear like water. Choose water and other caffeine-free clear liquids until you feel better. If you have kidney, heart, or liver disease and have to limit fluids, talk with your doctor before you increase the amount of fluids you drink. · Begin eating small amounts of mild foods the next day, if you feel like it. ¨ Try yogurt that has live cultures of Lactobacillus. (Check the label.)  ¨ Avoid spicy foods, fruits, alcohol, and caffeine until 48 hours after all symptoms are gone.   ¨ Avoid chewing gum that contains sorbitol. ¨ Avoid dairy products (except for yogurt with Lactobacillus) while you have diarrhea and for 3 days after symptoms are gone. · The doctor may recommend that you take over-the-counter medicine, such as loperamide (Imodium), if you still have diarrhea after 6 hours. Read and follow all instructions on the label. Do not use this medicine if you have bloody diarrhea, a high fever, or other signs of serious illness. Call your doctor if you think you are having a problem with your medicine. When should you call for help? Call 911 anytime you think you may need emergency care. For example, call if:  ? · You passed out (lost consciousness). ? · Your stools are maroon or very bloody. ?Call your doctor now or seek immediate medical care if:  ? · You are dizzy or lightheaded, or you feel like you may faint. ? · Your stools are black and look like tar, or they have streaks of blood. ? · You have new or worse belly pain. ? · You have symptoms of dehydration, such as:  ¨ Dry eyes and a dry mouth. ¨ Passing only a little dark urine. ¨ Feeling thirstier than usual.   ? · You have a new or higher fever. ? Watch closely for changes in your health, and be sure to contact your doctor if:  ? · Your diarrhea is getting worse. ? · You see pus in the diarrhea. ? · You are not getting better after 2 days (48 hours). Where can you learn more? Go to http://solis-walt.info/. Enter Z463 in the search box to learn more about \"Diarrhea: Care Instructions. \"  Current as of: March 20, 2017  Content Version: 11.4  © 7904-5160 [employer]. Care instructions adapted under license by [employer] (which disclaims liability or warranty for this information). If you have questions about a medical condition or this instruction, always ask your healthcare professional. [address] any warranty or liability for your use of this information.

## 2018-06-07 NOTE — ED NOTES
Patient discharged by Dr. Glenys Brown. Patient provided with discharge instructions Rx and instructions on follow up care. Patient out of ED ambulatory.    Pt educated on foods to eat and avoid following GI upset  Questions were asked and answers were provided

## 2018-06-07 NOTE — ED NOTES
Stool sent as ordered. IV fluids infusing at this time. Pending results.   Able to tolerate PO challenge

## 2018-06-07 NOTE — ED NOTES
Report received from Jason Hoskins, [facility]. SBAR, Kardex, ED Summary and Intake/Output were discussed.     Gage Fox

## 2018-06-07 NOTE — ED PROVIDER NOTES
EMERGENCY DEPARTMENT HISTORY AND PHYSICAL EXAM      Date: 6/7/2018  Patient Name: Asha Moreno    History of Presenting Illness     Chief Complaint   Patient presents with    Diarrhea     He started having diarrhea yesterday and has been having it constantly since then. No vomiting just mild nausea and no appetite. History Provided By: Patient    HPI: Asha Moreno, 20 y.o. male with no pertinent PMHx, presents ambulatory to the ED with cc of acute onset diarrhea, generalized throbbing headache and generalized weakness progressively worsening since yesterday. Pt reports associated sx of intermittent nausea with PO, lower abdominal cramping, chills, and a decreased appetite secondary to sx. He expresses there are no exacerbating or reliving factors to his discomfort. Pt discloses he has been able to tolerate water but becomes nauseous with any PO leading him to the ED for evaluation. Pt denies any h/o similar sx but reports a h/o cholecystectomy and appendectomy. He denies any recent sick contact. He denies any fevers, sore throat, chest pain, SOB, vomiting, or hematochezia. There are no other complaints, changes, or physical findings at this time. PCP: None   SHx: (+) Tobacco: 0.25 ppd; (+) ETOH: socially; (-) Illicit drug use    Current Facility-Administered Medications   Medication Dose Route Frequency Provider Last Rate Last Dose    sodium chloride 0.9 % bolus infusion 1,000 mL  1,000 mL IntraVENous ONCE Jade Hairston MD         Current Outpatient Prescriptions   Medication Sig Dispense Refill    azithromycin (ZITHROMAX Z-SHAHIDA) 250 mg tablet Take 2 tablets on day 1, then 1 tablet days 2-5. 6 Tab 0       Past History     Past Medical History:  No past medical history on file. Past Surgical History:  Past Surgical History:   Procedure Laterality Date    HX APPENDECTOMY      HX CHOLECYSTECTOMY         Family History:  No family history on file.     Social History:  Social History   Substance Use Topics    Smoking status: Current Every Day Smoker     Packs/day: 0.25    Smokeless tobacco: Not on file    Alcohol use Yes      Comment: socially        Allergies:  No Known Allergies      Review of Systems   Review of Systems   Constitutional: Positive for appetite change and chills. Negative for activity change, fever and unexpected weight change. HENT: Negative for congestion. Eyes: Negative for pain and visual disturbance. Respiratory: Negative for cough and shortness of breath. Cardiovascular: Negative for chest pain. Gastrointestinal: Positive for abdominal pain (lower cramping), diarrhea and nausea (with PO ). Negative for blood in stool and vomiting. Genitourinary: Negative for dysuria. Musculoskeletal: Negative for back pain. Skin: Negative for rash. Neurological: Positive for weakness (generalized ) and headaches. Physical Exam   Physical Exam   Constitutional: He is oriented to person, place, and time. He appears well-developed and well-nourished. Well appearing young male in mild distress      HENT:   Head: Normocephalic and atraumatic. Mouth/Throat: Oropharynx is clear and moist.   Eyes: Conjunctivae and EOM are normal. Pupils are equal, round, and reactive to light. Right eye exhibits no discharge. Left eye exhibits no discharge. Neck: Normal range of motion. Neck supple. Cardiovascular: Regular rhythm and normal heart sounds. Tachycardia present. No murmur heard. Pulmonary/Chest: Effort normal and breath sounds normal. No respiratory distress. He has no wheezes. He has no rales. Abdominal: Soft. Bowel sounds are normal. He exhibits no distension. There is no tenderness. There is no rebound and no guarding. Musculoskeletal: Normal range of motion. He exhibits no edema. Neurological: He is alert and oriented to person, place, and time. No cranial nerve deficit. He exhibits normal muscle tone. Skin: Skin is warm. No rash noted. He is diaphoretic. Nursing note and vitals reviewed. Diagnostic Study Results     Labs -   No results found for this or any previous visit (from the past 12 hour(s)). Radiologic Studies -   No orders to display     CT Results  (Last 48 hours)    None        CXR Results  (Last 48 hours)    None            Medical Decision Making   I am the first provider for this patient. I reviewed the vital signs, available nursing notes, past medical history, past surgical history, family history and social history. Vital Signs-Reviewed the patient's vital signs. Patient Vitals for the past 12 hrs:   Temp Pulse Resp BP SpO2   06/07/18 0326 98.4 °F (36.9 °C) (!) 118 16 (!) 144/94 99 %       Pulse Oximetry Analysis - 99% on room air    Cardiac Monitor:   Rate: 118 bpm  Rhythm: Sinus Tachycardia     Records Reviewed: Nursing Notes, Old Medical Records and Previous Laboratory Studies    Provider Notes (Medical Decision Making):   Acute diarrheal illness without fever or vomiting. Exam without evidence of peritonitis. ED Course:   Initial assessment performed. The patients presenting problems have been discussed, and they are in agreement with the care plan formulated and outlined with them. I have encouraged them to ask questions as they arise throughout their visit. 05:30 Feeling improved. x2 episode of diarrhea during ER visit, unable to obtain sample. Continue IVF with PO challenge while awaiting CBC.     06:50 Feeling improved. Doing well. tolerating PO. Discussed return precautions and home care. Critical Care Time:   0    Disposition:  DISCHARGE NOTE  6:51 AM  The patient has been re-evaluated and is ready for discharge. Reviewed available results with patient. Counseled pt on diagnosis and care plan. Pt has expressed understanding, and all questions have been answered. Pt agrees with plan and agrees to F/U as recommended, or return to the ED if their sxs worsen.  Discharge instructions have been provided and explained to the pt, along with reasons to return to the ED. PLAN:  1. Current Discharge Medication List        2. Follow-up Information     None        Return to ED if worse     Diagnosis     Clinical Impression: No diagnosis found. Attestations:    Attestation: This note is prepared by Latrell Georges, acting as Scribe for Soniya Romero MD.      Soniya Romero MD: The scribe's documentation has been prepared under my direction and personally reviewed by me in its entirety. I confirm that the note above accurately reflects all work, treatment, procedures, and medical decision making performed by me.

## 2018-06-07 NOTE — ED NOTES
Discharged by provider. Ready for discharge when IV fluids finished. Bedside and Verbal shift change report given to Jolie Swan (oncoming nurse) by Rosibel Spain RN (offgoing nurse). Report included the following information SBAR, Kardex, ED Summary, [facility] and Recent Results.

## 2018-06-10 RX ORDER — CIPROFLOXACIN 500 MG/1
500 TABLET ORAL 2 TIMES DAILY
Qty: 10 TAB | Refills: 0 | Status: SHIPPED | OUTPATIENT
Start: 2018-06-10 | End: 2018-06-15

## 2018-06-10 NOTE — PROGRESS NOTES
Attempted to call pt and left HIPAA compliant VM requesting a return call. Pt will need appropriate treatment.   Lino Saleh PA-C

## 2018-06-11 LAB
BACTERIA SPEC CULT: ABNORMAL
C JEJUNI+C COLI AG STL QL: NEGATIVE
E COLI SXT1+2 STL IA: NEGATIVE
SERVICE CMNT-IMP: ABNORMAL

## 2018-06-11 NOTE — PROGRESS NOTES
Prescribed cipro to preferred pharmacy. Pt expresses understanding. Provided customary ED return precautions.

## 2018-06-12 LAB
O+P SPEC MICRO: NORMAL
O+P STL CONC: NORMAL
SPECIMEN SOURCE: NORMAL

## 2018-08-17 LAB
Lab: NORMAL
REFERENCE LAB,REFLB: NORMAL
TEST DESCRIPTION:,ATST: NORMAL

## 2019-05-20 ENCOUNTER — APPOINTMENT (OUTPATIENT)
Dept: GENERAL RADIOLOGY | Age: 24
End: 2019-05-20
Attending: PHYSICIAN ASSISTANT
Payer: SELF-PAY

## 2019-05-20 ENCOUNTER — HOSPITAL ENCOUNTER (EMERGENCY)
Age: 24
Discharge: HOME | End: 2019-05-20
Attending: EMERGENCY MEDICINE
Payer: SELF-PAY

## 2019-05-20 VITALS
BODY MASS INDEX: 33.86 KG/M2 | RESPIRATION RATE: 18 BRPM | OXYGEN SATURATION: 98 % | HEART RATE: 96 BPM | DIASTOLIC BLOOD PRESSURE: 85 MMHG | SYSTOLIC BLOOD PRESSURE: 143 MMHG | TEMPERATURE: 99.1 F | HEIGHT: 71 IN | WEIGHT: 241.84 LBS

## 2019-05-20 DIAGNOSIS — J01.90 ACUTE SINUSITIS, RECURRENCE NOT SPECIFIED, UNSPECIFIED LOCATION: Primary | ICD-10-CM

## 2019-05-20 LAB — DEPRECATED S PYO AG THROAT QL EIA: NEGATIVE

## 2019-05-20 PROCEDURE — 87070 CULTURE OTHR SPECIMN AEROBIC: CPT

## 2019-05-20 PROCEDURE — 99282 EMERGENCY DEPT VISIT SF MDM: CPT

## 2019-05-20 PROCEDURE — 71046 X-RAY EXAM CHEST 2 VIEWS: CPT

## 2019-05-20 PROCEDURE — 87880 STREP A ASSAY W/OPTIC: CPT

## 2019-05-20 RX ORDER — PREDNISONE 10 MG/1
TABLET ORAL
Qty: 21 TAB | Refills: 0 | Status: SHIPPED | OUTPATIENT
Start: 2019-05-20 | End: 2020-08-06 | Stop reason: ALTCHOICE

## 2019-05-20 RX ORDER — AMOXICILLIN AND CLAVULANATE POTASSIUM 875; 125 MG/1; MG/1
1 TABLET, FILM COATED ORAL 2 TIMES DAILY
Qty: 20 TAB | Refills: 0 | Status: SHIPPED | OUTPATIENT
Start: 2019-05-20 | End: 2019-05-20

## 2019-05-20 RX ORDER — AMOXICILLIN AND CLAVULANATE POTASSIUM 875; 125 MG/1; MG/1
1 TABLET, FILM COATED ORAL 2 TIMES DAILY
Qty: 20 TAB | Refills: 0 | Status: SHIPPED | OUTPATIENT
Start: 2019-05-20 | End: 2020-08-06 | Stop reason: ALTCHOICE

## 2019-05-20 RX ORDER — IBUPROFEN 800 MG/1
800 TABLET ORAL
Qty: 30 TAB | Refills: 0 | Status: SHIPPED | OUTPATIENT
Start: 2019-05-20 | End: 2019-05-20

## 2019-05-20 RX ORDER — CETIRIZINE HCL 10 MG
10 TABLET ORAL DAILY
Qty: 20 TAB | Refills: 0 | Status: SHIPPED | OUTPATIENT
Start: 2019-05-20 | End: 2019-05-20

## 2019-05-20 RX ORDER — CETIRIZINE HCL 10 MG
10 TABLET ORAL DAILY
Qty: 20 TAB | Refills: 0 | Status: SHIPPED | OUTPATIENT
Start: 2019-05-20

## 2019-05-20 RX ORDER — PREDNISONE 10 MG/1
TABLET ORAL
Qty: 21 TAB | Refills: 0 | Status: SHIPPED | OUTPATIENT
Start: 2019-05-20 | End: 2019-05-20

## 2019-05-20 RX ORDER — IBUPROFEN 800 MG/1
800 TABLET ORAL
Qty: 20 TAB | Refills: 0 | Status: SHIPPED | OUTPATIENT
Start: 2019-05-20 | End: 2020-08-06 | Stop reason: ALTCHOICE

## 2019-05-20 NOTE — ED PROVIDER NOTES
EMERGENCY DEPARTMENT HISTORY AND PHYSICAL EXAM      Date: 5/20/2019  Patient Name: Natacha Spears    History of Presenting Illness     Chief Complaint   Patient presents with    Neck Pain     pt reports neck pain, sore throat, chills since Friday night    Chills    Sore Throat       History Provided By: Patient    HPI: Natacha Spears, 24 y.o. male presents ambulatory to the ED with cc of 3 days of 6 out of 10 constant aching subjective fever, sore throat that is worse with swallowing and is associated with a slight cough. He also complains of congestion, sinus fullness and sinus pain and pressure. No known sick contacts. He took some Advil yesterday. There are no other complaints, changes, or physical findings at this time. PCP: None      Past History     Past Medical History:  No past medical history on file. Past Surgical History:  Past Surgical History:   Procedure Laterality Date    HX APPENDECTOMY      HX CHOLECYSTECTOMY         Family History:  No family history on file. Social History:  Social History     Tobacco Use    Smoking status: Current Every Day Smoker     Packs/day: 0.25   Substance Use Topics    Alcohol use: Yes     Comment: socially     Drug use: No       Allergies:  No Known Allergies  Review of Systems   Review of Systems   Constitutional: Negative for fatigue and fever. HENT: Positive for congestion, sinus pressure, sinus pain and sore throat. Negative for ear pain and rhinorrhea. Eyes: Negative for pain and redness. Respiratory: Positive for cough. Negative for wheezing. Cardiovascular: Negative for chest pain and palpitations. Gastrointestinal: Negative for abdominal pain, nausea and vomiting. Genitourinary: Negative for dysuria, frequency and urgency. Musculoskeletal: Negative for back pain, neck pain and neck stiffness. Skin: Negative for rash and wound. Neurological: Negative for weakness, light-headedness, numbness and headaches.      Physical Exam Physical Exam   Constitutional: He is oriented to person, place, and time. He appears well-developed and well-nourished. Non-toxic appearance. No distress. HENT:   Head: Normocephalic and atraumatic. Head is without right periorbital erythema and without left periorbital erythema. Right Ear: External ear normal.   Left Ear: External ear normal.   Nose: Nose normal.   Mouth/Throat: Uvula is midline. No trismus in the jaw. Eyes: Pupils are equal, round, and reactive to light. Conjunctivae and EOM are normal. No scleral icterus. Neck: Normal range of motion and full passive range of motion without pain. Cardiovascular: Normal rate, regular rhythm and normal heart sounds. Pulmonary/Chest: Effort normal and breath sounds normal. No accessory muscle usage. No tachypnea. No respiratory distress. He has no decreased breath sounds. He has no wheezes. Abdominal: Soft. There is no tenderness. There is no rigidity and no guarding. Musculoskeletal: Normal range of motion. Neurological: He is alert and oriented to person, place, and time. He is not disoriented. No cranial nerve deficit or sensory deficit. GCS eye subscore is 4. GCS verbal subscore is 5. GCS motor subscore is 6. Skin: Skin is intact. No rash noted. Psychiatric: He has a normal mood and affect. His speech is normal.   Nursing note and vitals reviewed. Diagnostic Study Results     Labs -     Recent Results (from the past 12 hour(s))   STREP AG SCREEN, GROUP A    Collection Time: 05/20/19  2:55 PM   Result Value Ref Range    Group A Strep Ag ID NEGATIVE  NEG         Radiologic Studies -   XR CHEST PA LAT   Final Result   IMPRESSION: No acute cardiopulmonary process. CT Results  (Last 48 hours)    None        CXR Results  (Last 48 hours)               05/20/19 1502  XR CHEST PA LAT Final result    Impression:  IMPRESSION: No acute cardiopulmonary process.        Narrative:  EXAM:  XR CHEST PA LAT       INDICATION:   cough COMPARISON: Chest radiograph 1/9/2018. FINDINGS: PA and lateral radiographs of the chest were obtained. No evidence of focal consolidation. No pleural effusion or pneumothorax. Heart,   inga, mediastinum are within normal limits. No acute osseous abnormalities. Cholecystectomy clips. Medical Decision Making   I am the first provider for this patient. I reviewed the vital signs, available nursing notes, past medical history, past surgical history, family history and social history. Vital Signs-Reviewed the patient's vital signs. Patient Vitals for the past 12 hrs:   Temp Pulse Resp BP SpO2   05/20/19 1446 99.1 °F (37.3 °C) 96 18 143/85 98 %   05/20/19 1156 99.6 °F (37.6 °C) 100 16 (!) 134/93 96 %       Pulse Oximetry Analysis - 98% on RA    Records Reviewed: Nursing Notes, Old Medical Records and Previous Laboratory Studies    Provider Notes (Medical Decision Making):   DDx: Pneumonia, acute streptococcal tonsillitis, acute sinusitis, viral URI, seasonal allergic rhinitis    ED Course:   Initial assessment performed. The patients presenting problems have been discussed, and they are in agreement with the care plan formulated and outlined with them. I have encouraged them to ask questions as they arise throughout their visit. Disposition:  Discharge    PLAN:  1. Discharge Medication List as of 5/20/2019  3:53 PM      START taking these medications    Details   predniSONE (STERAPRED DS) 10 mg dose pack Per Dose Pack instructions, Normal, Disp-21 Tab, R-0      cetirizine (ZYRTEC) 10 mg tablet Take 1 Tab by mouth daily. , Normal, Disp-20 Tab, R-0      ibuprofen (MOTRIN) 800 mg tablet Take 1 Tab by mouth every eight (8) hours as needed for Pain., Normal, Disp-30 Tab, R-0      amoxicillin-clavulanate (AUGMENTIN) 875-125 mg per tablet Take 1 Tab by mouth two (2) times a day., Normal, Disp-20 Tab, R-0           2.    Follow-up Information     Follow up With Specialties Details Why Contact Antoine Valdez  Schedule an appointment as soon as possible for a visit PRIMARY CARE: call to schedule follow up [address]   [phone]        Return to ED if worse     Diagnosis     Clinical Impression:   1.  Acute sinusitis, recurrence not specified, unspecified location

## 2019-05-22 LAB
BACTERIA SPEC CULT: NORMAL
SERVICE CMNT-IMP: NORMAL

## 2020-08-06 ENCOUNTER — OFFICE VISIT (OUTPATIENT)
Dept: FAMILY MEDICINE CLINIC | Age: 25
End: 2020-08-06

## 2020-08-06 VITALS
TEMPERATURE: 97.8 F | WEIGHT: 265.2 LBS | BODY MASS INDEX: 37.13 KG/M2 | SYSTOLIC BLOOD PRESSURE: 120 MMHG | DIASTOLIC BLOOD PRESSURE: 78 MMHG | HEART RATE: 80 BPM | OXYGEN SATURATION: 98 % | HEIGHT: 71 IN | RESPIRATION RATE: 16 BRPM

## 2020-08-06 DIAGNOSIS — D17.0 LIPOMA OF HEAD: ICD-10-CM

## 2020-08-06 DIAGNOSIS — L91.8 SKIN TAG: ICD-10-CM

## 2020-08-06 DIAGNOSIS — Z13.1 SCREENING FOR DIABETES MELLITUS: ICD-10-CM

## 2020-08-06 DIAGNOSIS — E66.01 SEVERE OBESITY (HCC): ICD-10-CM

## 2020-08-06 DIAGNOSIS — Z76.89 ESTABLISHING CARE WITH NEW DOCTOR, ENCOUNTER FOR: ICD-10-CM

## 2020-08-06 DIAGNOSIS — Z00.00 ANNUAL PHYSICAL EXAM: Primary | ICD-10-CM

## 2020-08-06 DIAGNOSIS — Z13.220 SCREENING CHOLESTEROL LEVEL: ICD-10-CM

## 2020-08-06 PROCEDURE — 99385 PREV VISIT NEW AGE 18-39: CPT | Performed by: FAMILY MEDICINE

## 2020-08-06 RX ORDER — OMEPRAZOLE 20 MG/1
1 CAPSULE, DELAYED RELEASE ORAL
COMMUNITY
Start: 2015-09-25

## 2020-08-06 NOTE — PROGRESS NOTES
Reilly Madison is a 31 y.o. male, who's a new patient to our practice. Previous PCP: none       has a past medical history of GERD (gastroesophageal reflux disease). Annual exam    Smoker 1/4ppd X 8 years. Lump on head for many years, just annoying, doesn't cause pain or bleed. Lipoma scalp  For now he doesn't want refer to derm     Screen cholesterol, diabetes    Skin tags. Reviewed: active problem list, medication list, allergies, family history, social history, notes from last encounter, lab results    A comprehensive review of systems was negative except for that written in the HPI, on 14 ROS. No Known Allergies  Current Outpatient Medications on File Prior to Visit   Medication Sig Dispense Refill    omeprazole (PRILOSEC) 20 mg capsule Take 1 Cap by mouth.  cetirizine (ZYRTEC) 10 mg tablet Take 1 Tab by mouth daily. 20 Tab 0     No current facility-administered medications on file prior to visit. Patient Active Problem List   Diagnosis Code    Severe obesity ([facility] [address] E66.01       Visit Vitals  /78   Pulse 80   Temp 97.8 °F (36.6 °C) (Temporal)   Resp 16   Ht 5' 11\" (1.803 m)   Wt 265 lb 3.2 oz (120.3 kg)   SpO2 98%   BMI 36.99 kg/m²     General appearance: alert, cooperative, no distress, appears stated age  Neurologic: Alert and oriented X 3, normal strength and tone, symmetric. Normal without focal findings. Cranial nerves 2-12 intact. Normal coordination and gait. Mental status: Alert, oriented, thought content appropriate, affect: stable, mood-congruent. Skin scalp soft oval symetrical beneath skin mass about 1X1.5cm  Head: Normocephalic, without obvious abnormality, atraumatic  Eyes: conjunctivae/corneas clear. PERRL, EOM's intact. Neck: supple, symmetrical, trachea midline, no JVD  Lungs: clear to auscultation bilaterally  Heart: regular rate and rhythm, S1, S2 normal, no murmur, click, rub or gallop  Abdomen: soft, non-tender.    Extremities: extremities normal, atraumatic, no cyanosis or edema      Assessment/Plans:    Diagnoses and all orders for this visit:    1. Annual physical exam  -     CBC W/O DIFF  -     HEMOGLOBIN A1C W/O EAG  -     LIPID PANEL  -     METABOLIC PANEL, COMPREHENSIVE  -     TSH 3RD GENERATION  -     URINALYSIS W/ RFLX MICROSCOPIC  -     HIV 1/2 AG/AB, 4TH GENERATION,W RFLX CONFIRM    2. Lipoma of head    3. Skin tag    4. Severe obesity ([address]    5. Screening for diabetes mellitus  -     HEMOGLOBIN A1C W/O EAG    6. Screening cholesterol level  -     LIPID PANEL    7. Establishing care with new doctor, encounter for      Discussed plans, risk/benefits of treatments/observations. Through the use of shared decision making, above plans were agreed upon. Medication compliance advised. Patient verbalized understanding.      Follow-up and Dispositions    · Return for f/u if needed for skin tags and if labs abnormal.           Zaida Cintron MD  8/6/2020

## 2021-12-27 ENCOUNTER — HOSPITAL ENCOUNTER (EMERGENCY)
Age: 26
Discharge: HOME | End: 2021-12-27
Attending: EMERGENCY MEDICINE
Payer: COMMERCIAL

## 2021-12-27 VITALS
OXYGEN SATURATION: 100 % | HEIGHT: 71 IN | SYSTOLIC BLOOD PRESSURE: 116 MMHG | WEIGHT: 262.35 LBS | RESPIRATION RATE: 20 BRPM | TEMPERATURE: 98.1 F | BODY MASS INDEX: 36.73 KG/M2 | HEART RATE: 76 BPM | DIASTOLIC BLOOD PRESSURE: 80 MMHG

## 2021-12-27 DIAGNOSIS — Z20.822 CLOSE EXPOSURE TO COVID-19 VIRUS: Primary | ICD-10-CM

## 2021-12-27 PROCEDURE — U0005 INFEC AGEN DETEC AMPLI PROBE: HCPCS

## 2021-12-27 PROCEDURE — 99281 EMR DPT VST MAYX REQ PHY/QHP: CPT

## 2021-12-27 NOTE — Clinical Note
Abdulkadir Mendez was seen and treated in our emergency department on 12/27/2021. He was tested for COVID-19 today. The result will be back in about 2 days.     Lanny Nesbitt

## 2021-12-27 NOTE — DISCHARGE INSTRUCTIONS
Prevention steps for patients with confirmed or suspected COVID-19 who do not need to be hospitalized    Timing for Self-Isolation    If you have been exposed but do not have symptoms:  If you suspect you have been exposed to someone with COVID-19 and don't have any symptoms, you should self-isolate at home for 14 days from the time of exposure. If you have symptoms whether or not you have been tested: If you have symptoms suggestive of COVID-19, such as fever or cough, regardless of whether you have been tested, you should self-isolate at home until 72 hours (3 days) after your symptoms have completely resolved AND 7 days after your symptoms first started, whichever is later. How to Properly Self-Isolate Due to COVID-19    Stay home except to get medical care  People who are mildly ill with COVID-19 are able to isolate at home during their illness. You should restrict activities outside your home, except for getting medical care. Do not go to work, school, or public areas. Avoid using public transportation, ride-sharing, or taxis. If you or a loved one must go out, please make sure to wash hands properly immediately on returning home. Disinfect touched surfaces. Do not touch your face. Separate yourself from other people and animals in your home  People: As much as possible, you should stay in a specific room and away from other people in your home. Also, you should use a separate bathroom, if available. Animals: You should restrict contact with pets and other animals while you are sick with COVID-19, just like you would around other people. Although there have not been reports of pets or other animals becoming sick with COVID-19, it is still recommended that people sick with COVID-19 limit contact with animals until more information is known about the virus. When possible, have another member of your household care for your animals while you are sick.  If you are sick with COVID-19, avoid contact with your pet, including petting, snuggling, being kissed or licked, and sharing food. If you must care for your pet or be around animals while you are sick, wash your hands before and after you interact with pets and wear a facemask. Call ahead before visiting your doctor  If you have a medical appointment, call the healthcare provider and tell them that you have or may have COVID-19. This will help the healthcare providers office take steps to keep other people from getting infected or exposed. Wear a facemask  You should wear a facemask when you are around other people (e.g., sharing a room or vehicle) or pets and before you enter a healthcare providers office. If you are not able to wear a facemask (for example, because it causes trouble breathing), then people who live with you should not stay in the same room with you, or they should wear a facemask if they enter your room. Cover your coughs and sneezes  Cover your mouth and nose with a tissue when you cough or sneeze. Throw used tissues in a lined trash can. Immediately wash your hands with soap and water for at least 20 seconds or, if soap and water are not available, clean your hands with an alcohol-based hand  that contains at least 60% alcohol. Clean your hands often  Wash your hands often with soap and water for at least 20 seconds, especially after blowing your nose, coughing, or sneezing; going to the bathroom; and before eating or preparing food. If soap and water are not readily available, use an alcohol-based hand  with at least 60% alcohol, covering all surfaces of your hands and rubbing them together until they feel dry. Soap and water are the best option if hands are visibly dirty. Avoid touching your eyes, nose, and mouth with unwashed hands. Avoid sharing personal household items  You should not share dishes, drinking glasses, cups, eating utensils, towels, or bedding with other people or pets in your home.  After using these items, they should be washed thoroughly with soap and water. Clean all high-touch surfaces everyday  High touch surfaces include counters, tabletops, doorknobs, bathroom fixtures, toilets, phones, keyboards, tablets, and bedside tables. Also, clean any surfaces that may have blood, stool, or body fluids on them. Use a household cleaning spray or wipe, according to the label instructions. Labels contain instructions for safe and effective use of the cleaning product including precautions you should take when applying the product, such as wearing gloves and making sure you have good ventilation during use of the product. Monitor your symptoms  Seek prompt medical attention if your illness is worsening (e.g., difficulty breathing). Before seeking care, call your healthcare provider and tell them that you have, or are being evaluated for, COVID-19. Put on a facemask before you enter the facility. These steps will help the healthcare providers office to keep other people in the office or waiting room from getting infected or exposed. Ask your healthcare provider to call the local or [facility] health department. Persons who are placed under active monitoring or facilitated self-monitoring should follow instructions provided by their local health department or occupational health professionals, as appropriate. When working with your local health department check their available hours. If you have a medical emergency and need to call 911, notify the dispatch personnel that you have, or are being evaluated for COVID-19. If possible, put on a facemask before emergency medical services arrive. Discontinuing home isolation  Patients with confirmed COVID-19 should remain under home isolation precautions until the risk of secondary transmission to others is thought to be low based on the above CDC recommendations.  The decision to discontinue home isolation precautions should be made on a case-by-case basis, in consultation with healthcare providers and state and local health departments. Oupatient testing  You can now get tested on an outpatient basis if you are showing symptoms of Covid19 at [facility], [facility], [facility], [facility], or [facility] locations. Go here for locations: [email or web address]     Further resources and [facility] Information Line  Please visit the [facility] website for more information. [employer]. [place] residents with questions about COVID-19 can call the [address] at [employer]

## 2021-12-27 NOTE — Clinical Note
Oziel Fox was seen and treated in our emergency department on 12/27/2021. He was tested for COVID-19 today. The result will be back in about 2 days.     Lanny Hagen

## 2021-12-27 NOTE — ED PROVIDER NOTES
EMERGENCY DEPARTMENT HISTORY AND PHYSICAL EXAM      Date: 12/27/2021  Patient Name: Gabriel Love    History of Presenting Illness     Chief Complaint   Patient presents with    Concern For [id number] (Coronavirus)     Patient ambulatory to triage w request to be tested for covid. History Provided By: Patient    HPI: Gabriel Love, 31 y.o. male with PMHx significant for GERD, presents to the ED with cc of concern for COVID-19. Patient began having nasal congestion, mild sore throat, and headache 4 days ago. Symptoms are gradually worsening. He found out that his cousin tested positive for COVID-19 so he would like to be tested today. He denies fever, chest pain, shortness of breath. He is immunized against COVID-19. There are no other complaints, changes, or physical findings at this time. PCP: Santiago Martinez MD    No current facility-administered medications on file prior to encounter. Current Outpatient Medications on File Prior to Encounter   Medication Sig Dispense Refill    omeprazole (PRILOSEC) 20 mg capsule Take 1 Cap by mouth.  cetirizine (ZYRTEC) 10 mg tablet Take 1 Tab by mouth daily. 20 Tab 0       Past History     Past Medical History:  Past Medical History:   Diagnosis Date    GERD (gastroesophageal reflux disease)        Past Surgical History:  Past Surgical History:   Procedure Laterality Date    HX APPENDECTOMY      HX CHOLECYSTECTOMY      HX HEENT         Family History:  Family History   Problem Relation Age of Onset    Hypertension Mother     Diabetes Father        Social History:  Social History     Tobacco Use    Smoking status: Current Every Day Smoker     Packs/day: 0.25     Years: 8.00     Pack years: 2.00    Smokeless tobacco: Never Used   Substance Use Topics    Alcohol use:  Yes     Alcohol/week: 4.0 standard drinks     Types: 4 Shots of liquor per week     Comment: socially     Drug use: No       Allergies:  No Known Allergies      Review of Systems   Review of Systems   Constitutional: Negative for chills and fever. HENT: Positive for congestion and sore throat. Negative for ear pain. Eyes: Negative for redness and visual disturbance. Respiratory: Positive for cough. Negative for shortness of breath. Cardiovascular: Negative for chest pain and palpitations. Gastrointestinal: Negative for abdominal pain, nausea and vomiting. Genitourinary: Negative for dysuria and hematuria. Musculoskeletal: Negative for back pain and gait problem. Skin: Negative for rash and wound. Neurological: Positive for headaches. Negative for dizziness and numbness. Psychiatric/Behavioral: Negative for behavioral problems and confusion. All other systems reviewed and are negative. Physical Exam   Physical Exam  Constitutional:       Appearance: He is not toxic-appearing. Comments: Interactive male in no acute distress. HENT:      Head: Normocephalic and atraumatic. Mouth/Throat:      Mouth: Mucous membranes are moist.   Eyes:      Extraocular Movements: Extraocular movements intact. Pupils: Pupils are equal, round, and reactive to light. Cardiovascular:      Rate and Rhythm: Normal rate and regular rhythm. Heart sounds: Normal heart sounds. No murmur heard. Pulmonary:      Effort: Pulmonary effort is normal. No respiratory distress. Breath sounds: Normal breath sounds. No wheezing. Musculoskeletal:         General: No deformity. Normal range of motion. Cervical back: Normal range of motion and neck supple. Skin:     General: Skin is warm and dry. Neurological:      General: No focal deficit present. Mental Status: He is alert and oriented to person, place, and time. Psychiatric:         Behavior: Behavior normal.           Diagnostic Study Results     Labs -   No results found for this or any previous visit (from the past 12 hour(s)).     Radiologic Studies -   No orders to display     CT Results  (Last 48 hours) None        CXR Results  (Last 48 hours)    None            Medical Decision Making   I am the first provider for this patient. I reviewed the vital signs, available nursing notes, past medical history, past surgical history, family history and social history. Vital Signs-Reviewed the patient's vital signs. Patient Vitals for the past 12 hrs:   Temp Pulse Resp BP SpO2   12/27/21 1536 98.1 °F (36.7 °C) 76 20 116/80 100 %         Records Reviewed: Nursing Notes and Old Medical Records      Provider Notes (Medical Decision Making):   DDx: Viral URI, COVID-19    Patient presents with URI symptoms. Pt is afebrile and clinically well appearing. Lungs are clear to auscultation and he is maintaining high oxygenation on room air. COVID-19 swab was sent and is pending. Discussed symptomatic treatment, follow up, and return precautions. ED Course:   Initial assessment performed. The patients presenting problems have been discussed, and they are in agreement with the care plan formulated and outlined with them. I have encouraged them to ask questions as they arise throughout their visit. Disposition:  6:06 PM    The patient has been re-evaluated and is ready for discharge. Reviewed available results with patient. Counseled patient on diagnosis and care plan. Patient has expressed understanding, and all questions have been answered. Patient agrees with plan and agrees to follow up as recommended, or to return to the ED if their symptoms worsen. Discharge instructions have been provided and explained to the patient, along with reasons to return to the ED. PLAN:  1. Discharge Medication List as of 12/27/2021  6:08 PM        2.    Follow-up Information     Follow up With Specialties Details Why Contact Info    Cony Sharp MD [facility] Medicine Schedule an appointment as soon as possible for a visit in 1 week  . [address]  MOB [address] [place] [address]  [phone]      [facility] EMERGENCY DEPT Emergency Medicine Go to  If symptoms worsen [address] [place] [address]  [phone]        Return to ED if worse     Diagnosis     Clinical Impression:   1. Close exposure to COVID-19 virus            Quinn Garcia.  GORDO Girard

## 2021-12-29 LAB
SARS-COV-2, XPLCVT: DETECTED
SOURCE, COVRS: ABNORMAL

## 2022-03-19 PROBLEM — E66.01 SEVERE OBESITY (HCC): Status: ACTIVE | Noted: 2020-08-06

## 2023-01-09 ENCOUNTER — OFFICE VISIT (OUTPATIENT)
Dept: FAMILY MEDICINE CLINIC | Age: 28
End: 2023-01-09

## 2023-01-09 VITALS
DIASTOLIC BLOOD PRESSURE: 77 MMHG | HEART RATE: 76 BPM | BODY MASS INDEX: 37.66 KG/M2 | RESPIRATION RATE: 16 BRPM | OXYGEN SATURATION: 99 % | HEIGHT: 71 IN | WEIGHT: 269 LBS | TEMPERATURE: 97.6 F | SYSTOLIC BLOOD PRESSURE: 119 MMHG

## 2023-01-09 DIAGNOSIS — M25.572 CHRONIC PAIN OF LEFT ANKLE: Primary | ICD-10-CM

## 2023-01-09 DIAGNOSIS — G89.29 CHRONIC PAIN OF LEFT ANKLE: Primary | ICD-10-CM

## 2023-01-09 DIAGNOSIS — D17.0 LIPOMA OF HEAD: ICD-10-CM

## 2023-01-09 PROCEDURE — 99213 OFFICE O/P EST LOW 20 MIN: CPT | Performed by: FAMILY MEDICINE

## 2023-01-09 RX ORDER — NAPROXEN 500 MG/1
500 TABLET ORAL
Qty: 30 TABLET | Refills: 1 | Status: SHIPPED | OUTPATIENT
Start: 2023-01-09

## 2023-01-09 NOTE — PROGRESS NOTES
Chief Complaint   Patient presents with    Ankle Pain     Left ankle     Ankle hurting for about 6 months. On feet at work.

## 2023-01-09 NOTE — PROGRESS NOTES
Skye Jordan is a 28 y.o. male    Met this pt once in 08/2020    2nd visit w this physician,        has a past medical history of GERD (gastroesophageal reflux disease). Left ankle pain X 6 months. Denies injuries or trauma. Pain lateral malleolus. He works in [employer] and on his feet all day. He's off today so no pain, but if he starts tomorrow, will hurt at the end of the day, and if work the day after then will hurts throughout the day. XR left foot    Mid scalp lump   Lipoma but he wants to get it remove. Reviewed: active problem list, medication list, allergies, notes from last encounter, lab results    A comprehensive review of systems was negative except for that written in the HPI. No Known Allergies  Current Outpatient Medications on File Prior to Visit   Medication Sig Dispense Refill    omeprazole (PRILOSEC) 20 mg capsule Take 1 Cap by mouth. cetirizine (ZYRTEC) 10 mg tablet Take 1 Tab by mouth daily. 20 Tab 0     No current facility-administered medications on file prior to visit. Patient Active Problem List   Diagnosis Code    Severe obesity ([facility] [address] E66.01       Visit Vitals  /77   Pulse 76   Temp 97.6 °F (36.4 °C) (Oral)   Resp 16   Ht 5' 11\" (1.803 m)   Wt 269 lb (122 kg)   SpO2 99%   BMI 37.52 kg/m²     General appearance: alert, cooperative, no distress, appears stated age  Neurologic: Alert and oriented X 3, normal strength and tone, symmetric. Normal without focal findings. Cranial nerves 2-12 intact. Normal coordination and gait. Mental status: Alert, oriented, thought content appropriate, affect: stable, mood-congruent. Head: Normocephalic, without obvious abnormality, atraumatic. Mid top scalp lipoma about1.5cm diameter  Eyes: conjunctivae/corneas clear. PERRL, EOM's intact.    Neck: supple, symmetrical, trachea midline, no JVD  Lungs: clear to auscultation bilaterally  Heart: regular rate and rhythm, S1, S2 normal, no murmur, click, rub or gallop  Abdomen: soft, non-tender. Extremities: extremities normal, atraumatic, no cyanosis or edema      Assessment/Plans: We did Xray of his ankle today, was normal.     Diagnoses and all orders for this visit:    1. Chronic pain of left ankle  -     naproxen (NAPROSYN) 500 mg tablet; Take 1 Tablet by mouth daily as needed for Pain. 2. Lipoma of head  -     REFERRAL TO GENERAL SURGERY    Discussed plans, risk/benefits of treatments/observations. Through the use of shared decision making, above plans were agreed upon. Medication compliance advised. Patient verbalized understanding.      Follow-up and Dispositions    Return if symptoms worsen or fail to improve, for otherwise f/up for annual exam.         Soumya Ruffin MD  1/11/2023

## 2023-01-20 ENCOUNTER — TELEPHONE (OUTPATIENT)
Dept: SURGERY | Age: 28
End: 2023-01-20

## 2023-01-20 NOTE — TELEPHONE ENCOUNTER
Call out to Mr Dominique Dominguez to obtain more info re: his possible lipoma to his scalp.  Had to leave a voicemail for him to call back

## 2023-01-23 ENCOUNTER — OFFICE VISIT (OUTPATIENT)
Dept: SURGERY | Age: 28
End: 2023-01-23
Payer: COMMERCIAL

## 2023-01-23 VITALS
SYSTOLIC BLOOD PRESSURE: 138 MMHG | TEMPERATURE: 98 F | HEART RATE: 77 BPM | RESPIRATION RATE: 20 BRPM | WEIGHT: 270.7 LBS | HEIGHT: 71 IN | OXYGEN SATURATION: 98 % | BODY MASS INDEX: 37.9 KG/M2 | DIASTOLIC BLOOD PRESSURE: 80 MMHG

## 2023-01-23 DIAGNOSIS — R22.0 SCALP MASS: Primary | ICD-10-CM

## 2023-01-23 PROCEDURE — 99203 OFFICE O/P NEW LOW 30 MIN: CPT | Performed by: SURGERY

## 2023-01-23 NOTE — PROGRESS NOTES
Identified pt with two pt identifiers(name and ). Reviewed record in preparation for visit and have obtained necessary documentation. All patient medications has been reviewed. Chief Complaint   Patient presents with    Mass     Seen at the request of Dr Akira Negrete for evaluation of possible lipoma to scalp       Health Maintenance Due   Topic    Hepatitis C Screening     COVID-19 Vaccine (1)    Pneumococcal 0-64 years (1 - PCV)       Vitals:    23 1506   BP: 138/80   Pulse: 77   Resp: 20   Temp: 98 °F (36.7 °C)   TempSrc: Temporal   SpO2: 98%   Weight: 122.8 kg (270 lb 11.2 oz)   Height: 5' 11\" (1.803 m)   PainSc:   3   PainLoc: Head       4. Have you been to the ER, urgent care clinic since your last visit? Hospitalized since your last visit? No    5. Have you seen or consulted any other health care providers outside of the [address] [place] [address] since your last visit? Include any pap smears or colon screening. No      Patient is accompanied by self I have received verbal consent from Miguel Thomas to discuss any/all medical information while they are present in the room.

## 2023-01-23 NOTE — PROGRESS NOTES
HISTORY OF PRESENT ILLNESS  Fatou Place is a 28 y.o. male. Scalp mass for 4-5 years  Getting bigger  No drainage  Occ hurts    Enlarging      +nicotine 5 cig/day      ____________________________________________________________________________  Patient presents with:  Mass: Seen at the request of Dr Ozzy Nye for evaluation of possible lipoma to scalp    /80 (BP 1 Location: Left upper arm, BP Patient Position: Sitting)   Pulse 77   Temp 98 °F (36.7 °C) (Temporal)   Resp 20   Ht 5' 11\" (1.803 m)   Wt 122.8 kg (270 lb 11.2 oz)   SpO2 98%   BMI 37.75 kg/m²   Past Medical History:  No date: GERD (gastroesophageal reflux disease)  Past Surgical History:  10/2016: HX APPENDECTOMY  11/2016: HX CHOLECYSTECTOMY  No date: HX HEENT  Social History    Socioeconomic History      Marital status: SINGLE    Tobacco Use      Smoking status: Every Day        Packs/day: 0.25        Years: 8.00        Pack years: 2        Types: Cigarettes      Smokeless tobacco: Never    Vaping Use      Vaping Use: Never used    Substance and Sexual Activity      Alcohol use: Yes        Alcohol/week: 4.0 standard drinks        Types: 4 Shots of liquor per week        Comment: socially       Drug use: No      Sexual activity: Yes        Partners: Male    Review of patient's family history indicates:  Problem: Hypertension      Relation: Mother          Age of Onset: (Not Specified)  Problem: Diabetes      Relation: Father          Age of Onset: (Not Specified)    Current Outpatient Medications:  naproxen (NAPROSYN) 500 mg tablet, Take 1 Tablet by mouth daily as needed for Pain. No current facility-administered medications for this visit. Allergies: No Known Allergies  _____________________________________________________________________________      Mass  The history is provided by the Patient. This is a chronic problem. The current episode started more than 1 week ago. The problem occurs daily.  The problem has been gradually worsening. Pertinent negatives include no chest pain, no abdominal pain, no headaches and no shortness of breath. Nothing aggravates the symptoms. Nothing relieves the symptoms. The treatment provided no relief. Review of Systems   Constitutional:  Negative for chills, fever and weight loss. HENT:  Negative for ear pain. Eyes:  Negative for pain. Respiratory:  Negative for shortness of breath. Cardiovascular:  Negative for chest pain. Gastrointestinal:  Negative for abdominal pain and blood in stool. Genitourinary:  Negative for hematuria. Musculoskeletal:  Negative for joint pain. Skin:  Negative for rash. Neurological:  Negative for dizziness, focal weakness, seizures and headaches. Endo/Heme/Allergies:  Does not bruise/bleed easily. Psychiatric/Behavioral:  The patient does not have insomnia. Physical Exam  Constitutional:       General: He is not in acute distress. Appearance: He is well-developed. He is not diaphoretic. HENT:      Head: Normocephalic and atraumatic. Mouth/Throat:      Pharynx: No oropharyngeal exudate. Eyes:      Pupils: Pupils are equal, round, and reactive to light. Neck:      Trachea: No tracheal deviation. Cardiovascular:      Rate and Rhythm: Normal rate and regular rhythm. Heart sounds: Normal heart sounds. No murmur heard. Pulmonary:      Effort: Pulmonary effort is normal. No respiratory distress. Breath sounds: Normal breath sounds. No wheezing. Abdominal:      General: Bowel sounds are normal. There is no distension. Palpations: Abdomen is soft. There is no mass. Tenderness: There is no abdominal tenderness. There is no guarding or rebound. Musculoskeletal:         General: No tenderness. Normal range of motion. Cervical back: Normal range of motion. Lymphadenopathy:      Cervical: No cervical adenopathy. Skin:     General: Skin is warm. Findings: No erythema or rash.    Neurological:      Mental Status: He is alert and oriented to person, place, and time. Psychiatric:         Behavior: Behavior normal.       ASSESSMENT and PLAN    ICD-10-CM ICD-9-CM    1. Scalp mass  R22.0 782. 2         We discussed the differential diagnosis. It is probably a pilar cyst.  Recommend excision. I had an extensive discussion with Sydnee Yates regarding the risks, benefits, and alternatives of proceeding with excision of this mass. Risks of surgery including the risk of anesthesia, bleeding, infection, injury to underlying structures, recurrence, need for further surgery, and the lack of symptomatic improvement were discussed and he is in agreement to proceed. We will schedule under sedation at his earliest convenience    Thank you for this consult.

## 2023-01-23 NOTE — Clinical Note
1/23/2023    Patient: Thea Meehan   YOB: 1995   Date of Visit: 1/23/2023     Mook Holocmb MD  [address] [place] [address] [address] [place] [address]  Via In [facility] [address]    Dear Mook Holcomb MD,      Thank you for referring Mr. Thea Meehan to  [facility] for evaluation. My notes for this consultation are attached. If you have questions, please do not hesitate to call me. I look forward to following your patient along with you.       Sincerely,    Lauren Sharma MD

## 2023-02-22 NOTE — PERIOP NOTES
[facility]  Preoperative Instructions        Surgery Date 02/27/23          Time of Arrival 02/24/23 between 2-5 pm    1. On the day of your surgery, please report to the Surgical Services Registration Desk and sign in at your designated time. The Surgery Center is located to the right of the Emergency Room. 2. You must have someone with you to drive you home. You should not drive a car for 24 hours following surgery. Please make arrangements for a friend or family member to stay with you for the first 24 hours after your surgery. 3. Do not have anything to eat or drink (including water, gum, mints, coffee, juice) after midnight ??02/26/23      . ? This may not apply to medications prescribed by your physician. ?(Please note below the special instructions with medications to take the morning of your procedure.)    4. We recommend you do not drink any alcoholic beverages for 24 hours before and after your surgery. 5. Contact your surgeons office for instructions on the following medications: non-steroidal anti-inflammatory drugs (i.e. Advil, Aleve), vitamins, and supplements. (Some surgeons will want you to stop these medications prior to surgery and others may allow you to take them)  **If you are currently taking Plavix, Coumadin, Aspirin and/or other blood-thinning agents, contact your surgeon for instructions. ** Your surgeon will partner with the physician prescribing these medications to determine if it is safe to stop or if you need to continue taking. Please do not stop taking these medications without instructions from your surgeon    6. Wear comfortable clothes. Wear glasses instead of contacts. Do not bring any money or jewelry. Please bring picture ID, insurance card, and any prearranged co-payment or hospital payment. Do not wear make-up, particularly mascara the morning of your surgery. Do not wear nail polish, particularly if you are having foot /hand surgery. Wear your hair loose or down, no ponytails, buns, anatoliy pins or clips. All body piercings must be removed. Please shower with antibacterial soap for three consecutive days before and on the morning of surgery, but do not apply any lotions, powders or deodorants after the shower on the day of surgery. Please use a fresh towels after each shower. Please sleep in clean clothes and change bed linens the night before surgery. Please do not shave for 48 hours prior to surgery. Shaving of the face is acceptable. 7. You should understand that if you do not follow these instructions your surgery may be cancelled. If your physical condition changes (I.e. fever, cold or flu) please contact your surgeon as soon as possible. 8. It is important that you be on time. If a situation occurs where you may be late, please call [phone] (OR Holding Area). 9. If you have any questions and or problems, please call [phone] (Pre-admission Testing). 10. Your surgery time may be subject to change. You will receive a phone call the evening prior if your time changes. 11.  If having outpatient surgery, you must have someone to drive you here, stay with you during the duration of your stay, and to drive you home at time of discharge. TAKE ALL MEDICATIONS DAY OF SURGERY EXCEPT: none      I understand a pre-operative phone call will be made to verify my surgery time. In the event that I am not available, I give permission for a message to be left on my answering service and/or with another person?   Yes [phone]         ___________________      __________   _________    (Signature of Patient)             (Witness)                (Date and Time)

## 2023-02-27 ENCOUNTER — HOSPITAL ENCOUNTER (OUTPATIENT)
Age: 28
Setting detail: OUTPATIENT SURGERY
Discharge: HOME | End: 2023-02-27
Attending: SURGERY | Admitting: SURGERY
Payer: COMMERCIAL

## 2023-02-27 ENCOUNTER — ANESTHESIA EVENT (OUTPATIENT)
Dept: SURGERY | Age: 28
End: 2023-02-27
Payer: COMMERCIAL

## 2023-02-27 ENCOUNTER — ANESTHESIA (OUTPATIENT)
Dept: SURGERY | Age: 28
End: 2023-02-27
Payer: COMMERCIAL

## 2023-02-27 VITALS
OXYGEN SATURATION: 98 % | SYSTOLIC BLOOD PRESSURE: 124 MMHG | HEART RATE: 79 BPM | WEIGHT: 264.11 LBS | TEMPERATURE: 98 F | BODY MASS INDEX: 36.98 KG/M2 | HEIGHT: 71 IN | RESPIRATION RATE: 16 BRPM | DIASTOLIC BLOOD PRESSURE: 69 MMHG

## 2023-02-27 DIAGNOSIS — R52 PAIN: Primary | ICD-10-CM

## 2023-02-27 PROCEDURE — 77030003029 HC SUT VCRL J&J -B: Performed by: SURGERY

## 2023-02-27 PROCEDURE — 88305 TISSUE EXAM BY PATHOLOGIST: CPT

## 2023-02-27 PROCEDURE — 12032 INTMD RPR S/A/T/EXT 2.6-7.5: CPT | Performed by: SURGERY

## 2023-02-27 PROCEDURE — 74011000250 HC RX REV CODE- 250: Performed by: SURGERY

## 2023-02-27 PROCEDURE — 76210000020 HC REC RM PH II FIRST 0.5 HR: Performed by: SURGERY

## 2023-02-27 PROCEDURE — 76060000032 HC ANESTHESIA 0.5 TO 1 HR: Performed by: SURGERY

## 2023-02-27 PROCEDURE — 74011250637 HC RX REV CODE- 250/637

## 2023-02-27 PROCEDURE — 74011250636 HC RX REV CODE- 250/636: Performed by: SURGERY

## 2023-02-27 PROCEDURE — 2709999900 HC NON-CHARGEABLE SUPPLY: Performed by: SURGERY

## 2023-02-27 PROCEDURE — 77030033138 HC SUT PGA STRATFX J&J -B: Performed by: SURGERY

## 2023-02-27 PROCEDURE — 74011250636 HC RX REV CODE- 250/636: Performed by: ANESTHESIOLOGY

## 2023-02-27 PROCEDURE — 77030002933 HC SUT MCRYL J&J -A: Performed by: SURGERY

## 2023-02-27 PROCEDURE — 11423 EXC H-F-NK-SP B9+MARG 2.1-3: CPT | Performed by: SURGERY

## 2023-02-27 PROCEDURE — 76210000006 HC OR PH I REC 0.5 TO 1 HR: Performed by: SURGERY

## 2023-02-27 PROCEDURE — 76010000138 HC OR TIME 0.5 TO 1 HR: Performed by: SURGERY

## 2023-02-27 PROCEDURE — 77030010507 HC ADH SKN DERMBND J&J -B: Performed by: SURGERY

## 2023-02-27 RX ORDER — ACETAMINOPHEN 500 MG
1000 TABLET ORAL 4 TIMES DAILY
Status: SHIPPED | COMMUNITY
Start: 2023-02-27

## 2023-02-27 RX ORDER — ONDANSETRON 4 MG/1
4 TABLET, ORALLY DISINTEGRATING ORAL
Qty: 8 TABLET | Refills: 0 | Status: SHIPPED | OUTPATIENT
Start: 2023-02-27

## 2023-02-27 RX ORDER — KETOROLAC TROMETHAMINE 30 MG/ML
30 INJECTION, SOLUTION INTRAMUSCULAR; INTRAVENOUS
Status: COMPLETED | OUTPATIENT
Start: 2023-02-27 | End: 2023-02-27

## 2023-02-27 RX ORDER — ACETAMINOPHEN 500 MG
TABLET ORAL
Status: COMPLETED
Start: 2023-02-27 | End: 2023-02-27

## 2023-02-27 RX ORDER — PROPOFOL 10 MG/ML
INJECTION, EMULSION INTRAVENOUS AS NEEDED
Status: DISCONTINUED | OUTPATIENT
Start: 2023-02-27 | End: 2023-02-27 | Stop reason: HOSPADM

## 2023-02-27 RX ORDER — OXYCODONE HYDROCHLORIDE 5 MG/1
5 TABLET ORAL
Qty: 15 TABLET | Refills: 0 | Status: SHIPPED | OUTPATIENT
Start: 2023-02-27 | End: 2023-03-06

## 2023-02-27 RX ORDER — IBUPROFEN 600 MG/1
600 TABLET ORAL
Qty: 25 TABLET | Refills: 1 | Status: SHIPPED | OUTPATIENT
Start: 2023-02-27 | End: 2023-03-04

## 2023-02-27 RX ORDER — SODIUM CHLORIDE, SODIUM LACTATE, POTASSIUM CHLORIDE, CALCIUM CHLORIDE 600; 310; 30; 20 MG/100ML; MG/100ML; MG/100ML; MG/100ML
25 INJECTION, SOLUTION INTRAVENOUS CONTINUOUS
Status: DISCONTINUED | OUTPATIENT
Start: 2023-02-27 | End: 2023-02-27 | Stop reason: HOSPADM

## 2023-02-27 RX ORDER — FENTANYL CITRATE 50 UG/ML
25 INJECTION, SOLUTION INTRAMUSCULAR; INTRAVENOUS
Status: DISCONTINUED | OUTPATIENT
Start: 2023-02-27 | End: 2023-02-27 | Stop reason: HOSPADM

## 2023-02-27 RX ORDER — MIDAZOLAM HYDROCHLORIDE 1 MG/ML
1 INJECTION, SOLUTION INTRAMUSCULAR; INTRAVENOUS AS NEEDED
Status: CANCELLED | OUTPATIENT
Start: 2023-02-27

## 2023-02-27 RX ORDER — BUPIVACAINE HYDROCHLORIDE AND EPINEPHRINE 5; 5 MG/ML; UG/ML
INJECTION, SOLUTION EPIDURAL; INTRACAUDAL; PERINEURAL AS NEEDED
Status: DISCONTINUED | OUTPATIENT
Start: 2023-02-27 | End: 2023-02-27 | Stop reason: HOSPADM

## 2023-02-27 RX ORDER — KETOROLAC TROMETHAMINE 30 MG/ML
INJECTION, SOLUTION INTRAMUSCULAR; INTRAVENOUS
Status: DISCONTINUED
Start: 2023-02-27 | End: 2023-02-27 | Stop reason: HOSPADM

## 2023-02-27 RX ORDER — ACETAMINOPHEN 500 MG
1000 TABLET ORAL ONCE
Status: COMPLETED | OUTPATIENT
Start: 2023-02-27 | End: 2023-02-27

## 2023-02-27 RX ORDER — SODIUM CHLORIDE, SODIUM LACTATE, POTASSIUM CHLORIDE, CALCIUM CHLORIDE 600; 310; 30; 20 MG/100ML; MG/100ML; MG/100ML; MG/100ML
25 INJECTION, SOLUTION INTRAVENOUS CONTINUOUS
Status: CANCELLED | OUTPATIENT
Start: 2023-02-27 | End: 2023-02-28

## 2023-02-27 RX ORDER — LIDOCAINE HYDROCHLORIDE 10 MG/ML
0.1 INJECTION, SOLUTION EPIDURAL; INFILTRATION; INTRACAUDAL; PERINEURAL AS NEEDED
Status: CANCELLED | OUTPATIENT
Start: 2023-02-27

## 2023-02-27 RX ORDER — FENTANYL CITRATE 50 UG/ML
50 INJECTION, SOLUTION INTRAMUSCULAR; INTRAVENOUS AS NEEDED
Status: CANCELLED | OUTPATIENT
Start: 2023-02-27

## 2023-02-27 RX ORDER — MIDAZOLAM HYDROCHLORIDE 1 MG/ML
INJECTION, SOLUTION INTRAMUSCULAR; INTRAVENOUS AS NEEDED
Status: DISCONTINUED | OUTPATIENT
Start: 2023-02-27 | End: 2023-02-27 | Stop reason: HOSPADM

## 2023-02-27 RX ORDER — HYDROMORPHONE HYDROCHLORIDE 1 MG/ML
.2-.5 INJECTION, SOLUTION INTRAMUSCULAR; INTRAVENOUS; SUBCUTANEOUS
Status: DISCONTINUED | OUTPATIENT
Start: 2023-02-27 | End: 2023-02-27 | Stop reason: HOSPADM

## 2023-02-27 RX ORDER — PROPOFOL 10 MG/ML
INJECTION, EMULSION INTRAVENOUS
Status: DISCONTINUED | OUTPATIENT
Start: 2023-02-27 | End: 2023-02-27 | Stop reason: HOSPADM

## 2023-02-27 RX ORDER — POLYETHYLENE GLYCOL 3350 17 G/17G
17 POWDER, FOR SOLUTION ORAL DAILY
Status: SHIPPED | COMMUNITY
Start: 2023-02-27

## 2023-02-27 RX ORDER — ONDANSETRON 2 MG/ML
4 INJECTION INTRAMUSCULAR; INTRAVENOUS AS NEEDED
Status: DISCONTINUED | OUTPATIENT
Start: 2023-02-27 | End: 2023-02-27 | Stop reason: HOSPADM

## 2023-02-27 RX ORDER — FENTANYL CITRATE 50 UG/ML
INJECTION, SOLUTION INTRAMUSCULAR; INTRAVENOUS AS NEEDED
Status: DISCONTINUED | OUTPATIENT
Start: 2023-02-27 | End: 2023-02-27 | Stop reason: HOSPADM

## 2023-02-27 RX ADMIN — SODIUM CHLORIDE, POTASSIUM CHLORIDE, SODIUM LACTATE AND CALCIUM CHLORIDE: 600; 310; 30; 20 INJECTION, SOLUTION INTRAVENOUS at 10:35

## 2023-02-27 RX ADMIN — ACETAMINOPHEN 1000 MG: 500 TABLET ORAL at 12:08

## 2023-02-27 RX ADMIN — WATER 2 G: 1 INJECTION INTRAMUSCULAR; INTRAVENOUS; SUBCUTANEOUS at 10:39

## 2023-02-27 RX ADMIN — PROPOFOL 128 MCG/KG/MIN: 10 INJECTION, EMULSION INTRAVENOUS at 10:37

## 2023-02-27 RX ADMIN — FENTANYL CITRATE 50 MCG: 50 INJECTION, SOLUTION INTRAMUSCULAR; INTRAVENOUS at 10:39

## 2023-02-27 RX ADMIN — MIDAZOLAM HYDROCHLORIDE 2 MG: 1 INJECTION, SOLUTION INTRAMUSCULAR; INTRAVENOUS at 10:34

## 2023-02-27 RX ADMIN — PROPOFOL 40 MG: 10 INJECTION, EMULSION INTRAVENOUS at 10:37

## 2023-02-27 RX ADMIN — PROPOFOL 20 MG: 10 INJECTION, EMULSION INTRAVENOUS at 11:08

## 2023-02-27 RX ADMIN — FENTANYL CITRATE 50 MCG: 50 INJECTION, SOLUTION INTRAMUSCULAR; INTRAVENOUS at 11:08

## 2023-02-27 RX ADMIN — Medication 1000 MG: at 12:08

## 2023-02-27 RX ADMIN — KETOROLAC TROMETHAMINE 30 MG: 30 INJECTION, SOLUTION INTRAMUSCULAR; INTRAVENOUS at 11:58

## 2023-02-27 NOTE — PERIOP NOTES
TRANSFER - OUT REPORT:    Verbal report given to ZACKARY Hemphill RN(name) on Lennox Maxcy  being transferred to Phase II(unit) for routine progression of care       Report consisted of patients Situation, Background, Assessment and   Recommendations(SBAR). Information from the following report(s) OR Summary, Intake/Output, and MAR was reviewed with the receiving nurse. Opportunity for questions and clarification was provided.       Patient transported with:   Registered Nurse

## 2023-02-27 NOTE — ANESTHESIA POSTPROCEDURE EVALUATION
Procedure(s):  EXCISION SCALP MASS. MAC    Anesthesia Post Evaluation      Multimodal analgesia: multimodal analgesia used between 6 hours prior to anesthesia start to PACU discharge  Patient location during evaluation: PACU  Level of consciousness: sleepy but conscious  Pain management: adequate  Airway patency: patent  Anesthetic complications: no  Cardiovascular status: acceptable  Respiratory status: acceptable  Hydration status: acceptable  Comments: +Post-Anesthesia Evaluation and Assessment    Patient: Janna Rosales MRN: [id number]  SSN: [id number]   YOB: 1995  Age: 28 y.o. Sex: male      Cardiovascular Function/Vital Signs    /63   Pulse 80   Temp 36.7 °C (98 °F)   Resp 19   Ht 5' 11\" (1.803 m)   Wt 119.8 kg (264 lb 1.8 oz)   SpO2 98%   BMI 36.84 kg/m²     Patient is status post Procedure(s):  EXCISION SCALP MASS. Nausea/Vomiting: Controlled. Postoperative hydration reviewed and adequate. Pain:  Pain Scale 1: Numeric (0 - 10) (02/27/23 1200)  Pain Intensity 1: 4 (02/27/23 1200)   Managed. Neurological Status:   Neuro (WDL): Within Defined Limits (02/27/23 1136)   At baseline. Mental Status and Level of Consciousness: Arousable. Pulmonary Status:   O2 Device: None (Room air) (02/27/23 1155)   Adequate oxygenation and airway patent. Complications related to anesthesia: None    Post-anesthesia assessment completed. No concerns. Signed By: Anna Antunez MD    2/27/2023  Post anesthesia nausea and vomiting:  controlled  Final Post Anesthesia Temperature Assessment:  Normothermia (36.0-37.5 degrees C)      INITIAL Post-op Vital signs:   Vitals Value Taken Time   /63 02/27/23 1215   Temp 36.7 °C (98 °F) 02/27/23 1215   Pulse 75 02/27/23 1216   Resp 15 02/27/23 1216   SpO2 97 % 02/27/23 1216   Vitals shown include unvalidated device data.

## 2023-02-27 NOTE — OP NOTES
OPERATIVE NOTE  2/27/2023    Preperative Diagnosis: SCALP LESION  Post-operative Diagnosis: SCALP LESION    Procedure: Procedure(s):  EXCISION SCALP MASS 3 CM  LAYERED CLOSURE 6 CM    Surgeon: Halina العلي MD    Circ-1: Aris Mckeon  Scrub RN-1: Jorge Brown RN      Anesthesia: MAC + LOCAL    Estimated Blood Loss: Minimal  Specimens:   ID Type Source Tests Collected by Time Destination   1 : scalp lesion Preservative Scalp  Maura Sanchez MD 2/27/2023 1109 Pathology      Findings: lesion c/w a pilar cyst  Complications: None  Implants: None      Lennox Maxcy is a 28 y.o. male who has been brought to the operating room for procedure as above. The risks, benefits, and alternatives were explained and consent was obtained for the procedure. Procedure was confirmed and site marked with the patient. After IV sedation the patient was kept in the supine position. The area was prepped and draped in the usual manner. Local anesthetic was infiltrated into the skin and soft tissue surrounding the mass. A 6 cm incision was made to get kevan to the mass. The mass extended through the superficial fascia, but was not involving the deep fascia or bone. The mass was dissected free of surrounding tissues, it was partially collapsed of its contents and excised in its entirety and send for pathology. Hemostasis was noted. The superficial fascia was closed with interrupted 3-0 Vicryl; followed by closure of the skin with 4-0 Monocryl. The wound was covered with Dermabond. He remained stable during my presence in the operating room.

## 2023-02-27 NOTE — H&P
Pre-Op History and Physical    Subjective:     Cookie Garcia is a 28 y.o. male who is here for Procedure(s):  EXCISION SCALP MASS    Past Medical History:   Diagnosis Date    GERD (gastroesophageal reflux disease)     Ill-defined condition     scalp mass    Nausea & vomiting       Past Surgical History:   Procedure Laterality Date    HX APPENDECTOMY  10/2016    HX CHOLECYSTECTOMY  11/2016    HX HEENT      wisdom teeth extraction x 4     Family History   Problem Relation Age of Onset    Hypertension Mother     Diabetes Father       Social History     Tobacco Use    Smoking status: Every Day     Packs/day: 0.25     Years: 8.00     Pack years: 2.00     Types: Cigarettes    Smokeless tobacco: Never   Substance Use Topics    Alcohol use: Yes     Alcohol/week: 4.0 standard drinks     Types: 4 Shots of liquor per week     Comment: socially        Prior to Admission medications    Medication Sig Start Date End Date Taking? Authorizing Provider   naproxen (NAPROSYN) 500 mg tablet Take 1 Tablet by mouth daily as needed for Pain. 1/9/23   Phyllis Carter MD     No Known Allergies     Review of Systems:  A comprehensive review of systems was negative except for that written in the History of Present Illness. Objective: Intake and Output:    No intake/output data recorded. No intake/output data recorded. Physical Exam:   Lungs: clear to auscultation bilaterally  Heart: regular rate and rhythm  Abdomen: soft, non-tender. No masses,  no organomegaly        Data Review:   No results found for this or any previous visit (from the past 24 hour(s)). Assessment:     Active Problems:    * No active hospital problems. *      Plan:     I had an extensive discussion with Cookie Garcia regarding the risks, benefits, and alternatives of proceeding with a  Procedure(s):  EXCISION SCALP MASS.   Risks, benefits, and alternatives of surgery including the risk of anesthesia, bleeding, infection, injury to bowel, recurrence, chronic pain, and the lack of symptomatic improvement were discussed and [place] is in agreement to proceed. Site marked.             Signed By: Gabriel Borges MD     February 27, 2023

## 2023-02-27 NOTE — PERIOP NOTES
1136-Handoff Report from Operating Room to PACU    Report received from [facility] and Dr. Mireya Marion  regarding Jose Byers. Surgeon(s):  Marcella Ahuja MD  And Procedure(s) (LRB):  EXCISION SCALP MASS (N/A)  confirmed   with allergies and dressings discussed. Anesthesia type, drugs, patient history, complications, estimated blood loss, vital signs, intake and output, and last pain medication, lines, reversal medications, and temperature were reviewed.

## 2023-02-27 NOTE — DISCHARGE INSTRUCTIONS
Dr. Jack Michaud Discharge Instructions for:  Robb Sam    MRN: [id number] : 1995    Admitted: 2023  Discharged: 2023       What to do at Home    Recommended diet: Resume your normal diet    Recommended activity: as tolerated    Follow-up with Dr. Mark Borrero in 1-2 weeks. Call [phone] for an appointment. Wound Care:  Replace outer gauze with new dry gauze daily starting today to protect the \"glue\". See additional instructions below: How to Care for Your Wound After Its Treated With DERMABOND* Topical Skin Adhesive    DERMABOND* Topical Skin Adhesive (2-octyl cyanoacrylate) is a sterile, liquid skin adhesivethat holds wound edges together. The film will usually remain in place for 5 to 10 days, thennaturally fall off your skin. The following will answer some of your questions and provide instructions for proper care for yourwound while it is healing:    CHECK WOUND APPEARANCE   Some swelling, redness, and pain are common with all wounds and normally will go away as the wound heals. If swelling, redness, or pain increases or if the wound feels warm to the touch, contact a doctor. Also contact a doctor if the wound edges reopen or separate. REPLACE BANDAGES   If your wound is bandaged, keep the bandage dry. Replace the dressing daily until the adhesive film has fallen off or if the bandage should become wet, unless otherwise instructed by your physician. When changing the dressing, do not place tape directly over the DERMABOND adhesive film, because removing the tape later may also remove the film. AVOID TOPICAL MEDICATIONS   Do not apply liquid or ointment medications or any other product to your wound while the DERMABOND adhesive film is in place. These may loosen the film before your wound is healed. KEEP WOUND DRY AND PROTECTED   You may occasionally and briefly wet your wound in the shower or bath.  Do not soak or scrub your wound, do not swim, and avoid periods of heavy perspiration until the DERMABOND adhesive has naturally fallen off. After showering or bathing, gently blot your wound dry with a soft towel. If a protective dressing is being used, apply a fresh, dry bandage, being sure to keep the tape off the DERMABOND adhesive film. Apply a clean, dry bandage over the wound if necessary to protect it. Protect your wound from injury until the skin has had sufficient time to heal.   Do not scratch, rub, or pick at the DERMABOND adhesive film. This may loosen the film before your wound is healed. Protect the wound from prolonged exposure to sunlight or tanning lamps while the film is in place. DISCHARGE SUMMARY from Nurse    PATIENT INSTRUCTIONS:    After general anesthesia or intravenous sedation, for 24 hours or while taking prescription narcotics:    Have someone responsible help you with your care  Limit your activities  Do not drive and operate hazardous machinery  Do not make important personal, legal or business decisions  Do not drink alcoholic beverages  If you have not urinated within 8 hours after discharge, please contact your surgeon on call  Resume your medications unless otherwise instructed    From general anesthesia, intravenous sedation, or while taking prescription narcotics, you may experience:    Drowsiness, dizziness, sleepiness, or confusion  Difficulty remembering or delayed reaction times  Difficulty with your balance, especially while walking, move slowly and carefully, do not make sudden position changes  Difficulty focusing or blurred vision    You may not be aware of slight changes in your behavior and/or your reaction time because of the medication used during and after your procedure.     Report the following to your surgeon:  Excessive pain, swelling, redness or odor of or around the surgical area  Temperature over 100.5  Nausea and vomiting lasting longer than 4 hours or if unable to take medications  Any signs of decreased circulation or nerve impairment to extremity: change in color, persistent numbness, tingling, coldness or increase pain  Any questions or concerns         IF YOU REPORT TO AN EMERGENCY ROOM, DOCTOR'S OFFICE OR HOSPITAL WITHIN 24 HOURS AFTER YOUR PROCEDURE, BRING THIS SHEET AND YOUR AFTER VISIT SUMMARY WITH YOU AND GIVE IT TO THE PHYSICIAN OR NURSE ATTENDING YOU. These are general instructions for a healthy lifestyle (if applicable): No smoking/ No tobacco products/ Avoid exposure to secondhand smoke  Surgeon General's Warning:  Quitting smoking now greatly reduces serious risk to your health. Obesity, smoking, and sedentary lifestyle greatly increases your risk for illness    A healthy diet, regular physical exercise & weight monitoring are important for maintaining a healthy lifestyle    You may be retaining fluid if you have a history of heart failure or if you experience any of the following symptoms:  Weight gain of 3 pounds or more overnight or 5 pounds in a week, increased swelling in our hands or feet or shortness of breath while lying flat in bed. Please call your doctor as soon as you notice any of these symptoms; do not wait until your next office visit. A common side effect of anesthesia following surgery is nausea and/or vomiting. In order to decrease symptoms, it is wise to avoid foods that are high in fat, greasy foods, milk products, and spicy foods for the first 24 hours. Acceptable foods for the first 24 hours following surgery include but are not limited to:    soup  broth  toast   crackers   applesauce  bananas   mashed potatoes,  soft or scrambled eggs  oatmeal  jello    It is important to eat when taking your pain medication. This will help to prevent nausea. If possible, please try to time your meals with your medications. It is very important to stay hydrated following surgery. Sip fluids frequently while awake. Avoid acidic drinks such as citrus juices and soda for 24 hours.  Carbonated beverages may cause bloating and gas. Acceptable fluids include:    water (flavor packets may add variety)  coffee or tea (in moderation)  Gatorade  Jean Carlos-Aid  apple juice  cranberry juice    You are encouraged to cough and deep breathe every hour when awake. This will help to prevent respiratory complications following anesthesia. You may want to hug a pillow when coughing and sneezing to add additional support to the surgical area and to decrease discomfort if you had abdominal or chest surgery. If you are discharged home with support stockings, you may remove them after 24 hours. Support stockings are used to help prevent blood clots in the legs following surgery. TO PREVENT AN INFECTION      [address] YOUR HANDS    To prevent infection, good handwashing is the most important thing you or your caregiver can do. Wash your hands with soap and water or use the hand  we gave you before you touch any wounds. SHOWER    Use the antibacterial soap we gave you when you take a shower. Shower with this soap until your wounds are healed. To reach all areas of your body, you may need someone to help you. Dont forget to clean your belly button with every shower. USE CLEAN SHEETS    Use freshly cleaned sheets on your bed after surgery. To keep the surgery site clean, do not allow pets to sleep with you while your wound is still healing. STOP SMOKING    Stop smoking, or at least cut back on smoking    Smoking slows your healing. CONTROL YOUR BLOOD SUGAR    High blood sugars slow wound healing. If you are diabetic, control your blood sugar levels before and after your surgery. Please take time to review all of your Home Care Instructions and Medication Information sheets provided in your discharge packet. If you have any questions, please contact your surgeon's office. Thank you. The discharge information has been reviewed with the patient and instruction recipient.   The patient and instruction recipient verbalized understanding. Discharge medications reviewed with the patient and instruction recipient and appropriate educational materials and side effects teaching were provided. Please provide this summary of care documentation to your next provider.

## 2023-02-27 NOTE — PERIOP NOTES
No covid test  no s/s/ . Mepilex sacrum border prevenatitvely applied   skin itact. .     dr. Shanelle Albert   clipped   scalp   him  self at   mass  site. Pt   voided     in lobby. Neuro-   pupils  deferred. Right and left temporal pulse palp. Smile and tongue pertrussion   symmetrical    right  and   left dp and pt pulse palp. Skin  intact. Brisk cap refill   grasp   strong   left   seems  a little  weaker than   right . Right and left dp amd pt   pulse palp. Brisk cap refill. Strong  foot   pushes.

## 2023-02-27 NOTE — ANESTHESIA PREPROCEDURE EVALUATION
Relevant Problems   ENDOCRINE   (+) Severe obesity (HCC)       Anesthetic History     PONV          Review of Systems / Medical History  Patient summary reviewed, nursing notes reviewed and pertinent labs reviewed    Pulmonary  Within defined limits                 Neuro/Psych   Within defined limits           Cardiovascular  Within defined limits                Exercise tolerance: >4 METS     GI/Hepatic/Renal     GERD           Endo/Other        Obesity     Other Findings              Physical Exam    Airway  Mallampati: II  TM Distance: 4 - 6 cm  Neck ROM: normal range of motion   Mouth opening: Normal     Cardiovascular  Regular rate and rhythm,  S1 and S2 normal,  no murmur, click, rub, or gallop  Rhythm: regular  Rate: normal         Dental  No notable dental hx       Pulmonary  Breath sounds clear to auscultation               Abdominal  GI exam deferred       Other Findings            Anesthetic Plan    ASA: 2  Anesthesia type: MAC          Induction: Intravenous  Anesthetic plan and risks discussed with: Patient

## 2023-03-20 ENCOUNTER — OFFICE VISIT (OUTPATIENT)
Dept: SURGERY | Age: 28
End: 2023-03-20
Payer: COMMERCIAL

## 2023-03-20 VITALS
HEART RATE: 88 BPM | DIASTOLIC BLOOD PRESSURE: 72 MMHG | SYSTOLIC BLOOD PRESSURE: 118 MMHG | TEMPERATURE: 97.6 F | OXYGEN SATURATION: 97 % | WEIGHT: 265.2 LBS | RESPIRATION RATE: 20 BRPM | BODY MASS INDEX: 37.13 KG/M2 | HEIGHT: 71 IN

## 2023-03-20 DIAGNOSIS — L72.11 PILAR CYST: Primary | ICD-10-CM

## 2023-03-20 PROCEDURE — 99213 OFFICE O/P EST LOW 20 MIN: CPT | Performed by: SURGERY

## 2023-03-20 NOTE — PROGRESS NOTES
Identified pt with two pt identifiers(name and ). Reviewed record in preparation for visit and have obtained necessary documentation. All patient medications has been reviewed. Chief Complaint   Patient presents with    Surgical Follow-up     S/p Excision scalp mass 3cm, layered closure 6cm on 23       Health Maintenance Due   Topic    Hepatitis C Screening     COVID-19 Vaccine (1)    Pneumococcal 0-64 years (1 - PCV)       Vitals:    23 1541   BP: 118/72   Pulse: 88   Resp: 20   Temp: 97.6 °F (36.4 °C)   TempSrc: Temporal   SpO2: 97%   Weight: 120.3 kg (265 lb 3.2 oz)   Height: 5' 11\" (1.803 m)   PainSc:   0 - No pain       4. Have you been to the ER, urgent care clinic since your last visit? Hospitalized since your last visit? No    5. Have you seen or consulted any other health care providers outside of the [address] [place] [address] since your last visit? Include any pap smears or colon screening. No      Patient is accompanied by  cousin  I have received verbal consent from Beverly Medina to discuss any/all medical information while they are present in the room.

## 2023-03-20 NOTE — PROGRESS NOTES
Chief Complaint   Patient presents with    Surgical Follow-up     S/p Excision scalp mass 3cm, layered closure 6cm on 2/27/23     Reviewed pathology and provided a copy: Pilar cyst      Pain controlled  Taking no pain meds  complete resolution of preoperative symptoms    Physical Exam:   Wound: clean, dry, no drainage, the Dermabond is still in place    Doing well  Continue unrestricted activity. We reviewed care for the incision including applying some oil or conditioner over the incision to try to help loosen the Dermabond. He expressed understanding of our discussion and is agreeable with the plan. Follow-up: prn        I had an extensive and thorough discussion with Beverly Medina regarding current diagnosis and treatment recommendations. Total time spend with him was 20 minutes.   This included the following:  preparing to see the patient (reviewing prior records and tests),  performing a medically appropriate examination and/or evaluation,  counseling and educating the patient/family/caregiver,  documenting clinical information in the electronic or other health record,  independently interpreting results and communicating results to the patient/family/caregiver,  referring and communicating with other health care professionals,

## 2023-03-20 NOTE — Clinical Note
3/20/2023    Patient: Jose Byers   YOB: 1995   Date of Visit: 3/20/2023     Funmilayo Cerda MD  [address]  [address] [facility] [address]    Dear Funmilayo Cerda MD,      Thank you for referring Mr. Jose Byers to [address] [place] [address] for evaluation. My notes for this consultation are attached. If you have questions, please do not hesitate to call me. I look forward to following your patient along with you.       Sincerely,    Tracy Moss MD

## 2023-10-02 ENCOUNTER — OFFICE VISIT (OUTPATIENT)
Age: 28
End: 2023-10-02
Payer: COMMERCIAL

## 2023-10-02 ENCOUNTER — HOSPITAL ENCOUNTER (OUTPATIENT)
Facility: HOSPITAL | Age: 28
Discharge: HOME | End: 2023-10-05
Payer: COMMERCIAL

## 2023-10-02 VITALS
SYSTOLIC BLOOD PRESSURE: 120 MMHG | OXYGEN SATURATION: 98 % | TEMPERATURE: 98 F | HEIGHT: 71 IN | RESPIRATION RATE: 16 BRPM | HEART RATE: 90 BPM | WEIGHT: 272.4 LBS | BODY MASS INDEX: 38.14 KG/M2 | DIASTOLIC BLOOD PRESSURE: 79 MMHG

## 2023-10-02 DIAGNOSIS — M62.838 MUSCLE SPASMS OF NECK: ICD-10-CM

## 2023-10-02 DIAGNOSIS — M62.838 MUSCLE SPASMS OF NECK: Primary | ICD-10-CM

## 2023-10-02 PROCEDURE — 99213 OFFICE O/P EST LOW 20 MIN: CPT | Performed by: FAMILY MEDICINE

## 2023-10-02 PROCEDURE — 72040 X-RAY EXAM NECK SPINE 2-3 VW: CPT

## 2023-10-02 RX ORDER — CICLOPIROX 1 G/100ML
SHAMPOO TOPICAL
COMMUNITY
Start: 2023-07-24

## 2023-10-02 RX ORDER — NAPROXEN 500 MG/1
500 TABLET ORAL 2 TIMES DAILY PRN
Qty: 30 TABLET | Refills: 0 | Status: SHIPPED | OUTPATIENT
Start: 2023-10-02

## 2023-10-02 RX ORDER — CYCLOBENZAPRINE HCL 5 MG
5 TABLET ORAL
Qty: 20 TABLET | Refills: 0 | Status: SHIPPED | OUTPATIENT
Start: 2023-10-02 | End: 2023-10-22

## 2023-10-02 NOTE — PROGRESS NOTES
Chief Complaint   Patient presents with    Neck Pain     For a month       1. Have you been to the ER, urgent care clinic since your last visit? Hospitalized since your last visit? No    2. Have you seen or consulted any other health care providers outside of the [address] [place] [address] since your last visit? Include any pap smears or colon screening.  Yes
rhythm, S1, S2 normal, no murmur, click, rub or gallop  Abdomen: soft, non-tender. Extremities: extremities normal, atraumatic, no cyanosis or edema    Neck NROM      Reviewed: active problem list, medication list, allergies, health maintenance, notes from last encounter, lab results    Pertinent items are noted in HPI. No Known Allergies  Current Outpatient Medications on File Prior to Visit   Medication Sig Dispense Refill    Ciclopirox 1 % SHAM APPLY TO SCALP LET SIT FOR 5 MINUTES THEN RINSE EXTERNALLY EVERY 2 WEEKS 30 DAYS       No current facility-administered medications on file prior to visit.      Patient Active Problem List   Diagnosis    Severe obesity ([address])       Adry Rodriguez MD  10/2/2023

## 2023-10-27 ENCOUNTER — TELEPHONE (OUTPATIENT)
Age: 28
End: 2023-10-27

## 2023-10-27 NOTE — TELEPHONE ENCOUNTER
----- Message from Karina Penaloza sent at 10/24/2023 10:49 AM EDT -----  Subject: Referral Request    Reason for referral request? physical therapy  Provider patient wants to be referred to(if known):     Provider Phone Number(if known): Additional Information for Provider? Patient states he was at physical   therapy on 10/23/23 and was told they need referral or order for him to   continue from the doctor.  fax # is [phone] and this is to [address]  ---------------------------------------------------------------------------  --------------  [address]    [phone]; OK to leave message on voicemail  ---------------------------------------------------------------------------  --------------

## 2023-10-30 ENCOUNTER — OFFICE VISIT (OUTPATIENT)
Age: 28
End: 2023-10-30
Payer: COMMERCIAL

## 2023-10-30 VITALS
OXYGEN SATURATION: 98 % | RESPIRATION RATE: 16 BRPM | HEIGHT: 71 IN | TEMPERATURE: 97.3 F | BODY MASS INDEX: 38.16 KG/M2 | DIASTOLIC BLOOD PRESSURE: 77 MMHG | HEART RATE: 80 BPM | SYSTOLIC BLOOD PRESSURE: 123 MMHG | WEIGHT: 272.6 LBS

## 2023-10-30 DIAGNOSIS — Z13.1 SCREENING FOR DIABETES MELLITUS: ICD-10-CM

## 2023-10-30 DIAGNOSIS — Z13.220 SCREENING CHOLESTEROL LEVEL: ICD-10-CM

## 2023-10-30 DIAGNOSIS — Z79.899 ENCOUNTER FOR LONG-TERM (CURRENT) USE OF MEDICATIONS: ICD-10-CM

## 2023-10-30 DIAGNOSIS — Z00.00 ENCOUNTER FOR PREVENTIVE CARE: ICD-10-CM

## 2023-10-30 DIAGNOSIS — Z11.4 SCREENING FOR HIV (HUMAN IMMUNODEFICIENCY VIRUS): ICD-10-CM

## 2023-10-30 DIAGNOSIS — Z00.00 ENCOUNTER FOR PREVENTIVE CARE: Primary | ICD-10-CM

## 2023-10-30 DIAGNOSIS — Z11.59 NEED FOR HEPATITIS C SCREENING TEST: ICD-10-CM

## 2023-10-30 DIAGNOSIS — J40 BRONCHITIS: ICD-10-CM

## 2023-10-30 PROCEDURE — 99214 OFFICE O/P EST MOD 30 MIN: CPT | Performed by: FAMILY MEDICINE

## 2023-10-30 RX ORDER — BENZONATATE 200 MG/1
200 CAPSULE ORAL 3 TIMES DAILY PRN
Qty: 21 CAPSULE | Refills: 0 | Status: SHIPPED | OUTPATIENT
Start: 2023-10-30 | End: 2023-11-06

## 2023-10-30 RX ORDER — PREDNISONE 10 MG/1
TABLET ORAL
Qty: 18 TABLET | Refills: 0 | Status: SHIPPED | OUTPATIENT
Start: 2023-10-30

## 2023-10-30 RX ORDER — BETAMETHASONE DIPROPIONATE 0.5 MG/G
LOTION TOPICAL
COMMUNITY
Start: 2023-10-10

## 2023-10-30 RX ORDER — KETOCONAZOLE 20 MG/G
CREAM TOPICAL
COMMUNITY
Start: 2023-10-16

## 2023-10-30 RX ORDER — DESONIDE 0.5 MG/G
1 CREAM TOPICAL
COMMUNITY
Start: 2023-10-16

## 2023-10-30 NOTE — PROGRESS NOTES
Anupama Paiz is a 33 y.o. male     has a past medical history of GERD (gastroesophageal reflux disease), Ill-defined condition, and Nausea & vomiting. Assessment/Plans:    César Palmer was seen today for follow-up, annual exam and cough. Diagnoses and all orders for this visit:    Encounter for preventive care  -     CBC; Future  -     Comprehensive Metabolic Panel; Future  -     TSH; Future  -     Lipid Panel; Future  -     Hemoglobin A1C; Future  -     HIV 1/2 Ag/Ab, 4TH Generation,W Rflx Confirm; Future  -     Hepatitis C Antibody; Future    Bronchitis  -     predniSONE (DELTASONE) 10 MG tablet; 3 tabs daily for 3 days, then 2 tabs daily for 3 days, then 1 tab daily for 3 days. -     benzonatate (TESSALON) 200 MG capsule; Take 1 capsule by mouth 3 times daily as needed for Cough    Encounter for long-term (current) use of medications  -     CBC; Future  -     Comprehensive Metabolic Panel; Future  -     TSH; Future  -     Lipid Panel; Future  -     Hemoglobin A1C; Future  -     HIV 1/2 Ag/Ab, 4TH Generation,W Rflx Confirm; Future  -     Hepatitis C Antibody; Future    Screening for diabetes mellitus  -     Hemoglobin A1C; Future    Screening cholesterol level  -     Lipid Panel; Future    Screening for HIV (human immunodeficiency virus)  -     HIV 1/2 Ag/Ab, 4TH Generation,W Rflx Confirm; Future    Need for hepatitis C screening test  -     Hepatitis C Antibody; Future      Discussed plans, risk/benefits of treatments/observations. Through the use of shared decision making, above plans were agreed upon. Medication compliance advised. Patient verbalized understanding. Return for annual physical and sooner as needed or if labs abnormal.      Subjective:    Neck pain X 1 month. Denies injuries or trauma. Denies radiation down arms. Worse w stress. Not taking anything OTC. He also asked the dentist.   Xr cervical spine if he wants.    Refer to PT  Naproxen BID PRN  Flexeril QHS PRN  XR cervical spine is

## 2023-10-30 NOTE — PROGRESS NOTES
Chief Complaint   Patient presents with    Follow-up     2 week check    Annual Exam    Cough       1. Have you been to the ER, urgent care clinic since your last visit? Hospitalized since your last visit? No    2. Have you seen or consulted any other health care providers outside of the [address] [place] [address] Avenue since your last visit? Include any pap smears or colon screening.  No

## 2023-10-31 LAB
ALBUMIN SERPL-MCNC: 3.7 G/DL (ref 3.5–5)
ALBUMIN/GLOB SERPL: 1.2 (ref 1.1–2.2)
ALP SERPL-CCNC: 63 U/L (ref 45–117)
ALT SERPL-CCNC: 51 U/L (ref 12–78)
ANION GAP SERPL CALC-SCNC: 1 MMOL/L (ref 5–15)
AST SERPL-CCNC: 22 U/L (ref 15–37)
BILIRUB SERPL-MCNC: 0.2 MG/DL (ref 0.2–1)
BUN SERPL-MCNC: 12 MG/DL (ref 6–20)
BUN/CREAT SERPL: 11 (ref 12–20)
CALCIUM SERPL-MCNC: 8.6 MG/DL (ref 8.5–10.1)
CHLORIDE SERPL-SCNC: 109 MMOL/L (ref 97–108)
CHOLEST SERPL-MCNC: 107 MG/DL
CO2 SERPL-SCNC: 30 MMOL/L (ref 21–32)
CREAT SERPL-MCNC: 1.12 MG/DL (ref 0.7–1.3)
ERYTHROCYTE [DISTWIDTH] IN BLOOD BY AUTOMATED COUNT: 13.3 % (ref 11.5–14.5)
EST. AVERAGE GLUCOSE BLD GHB EST-MCNC: 123 MG/DL
GLOBULIN SER CALC-MCNC: 3.2 G/DL (ref 2–4)
GLUCOSE SERPL-MCNC: 98 MG/DL (ref 65–100)
HBA1C MFR BLD: 5.9 % (ref 4–5.6)
HCT VFR BLD AUTO: 48.5 % (ref 36.6–50.3)
HCV AB SER IA-ACNC: 0.03 INDEX
HCV AB SERPL QL IA: NONREACTIVE
HDLC SERPL-MCNC: 38 MG/DL
HDLC SERPL: 2.8 (ref 0–5)
HGB BLD-MCNC: 15.3 G/DL (ref 12.1–17)
HIV 1+2 AB+HIV1 P24 AG SERPL QL IA: NONREACTIVE
HIV 1/2 RESULT COMMENT: NORMAL
LDLC SERPL CALC-MCNC: 32.2 MG/DL (ref 0–100)
MCH RBC QN AUTO: 31 PG (ref 26–34)
MCHC RBC AUTO-ENTMCNC: 31.5 G/DL (ref 30–36.5)
MCV RBC AUTO: 98.4 FL (ref 80–99)
NRBC # BLD: 0 K/UL (ref 0–0.01)
NRBC BLD-RTO: 0 PER 100 WBC
PLATELET # BLD AUTO: 267 K/UL (ref 150–400)
PMV BLD AUTO: 10.7 FL (ref 8.9–12.9)
POTASSIUM SERPL-SCNC: 4.2 MMOL/L (ref 3.5–5.1)
PROT SERPL-MCNC: 6.9 G/DL (ref 6.4–8.2)
RBC # BLD AUTO: 4.93 M/UL (ref 4.1–5.7)
SODIUM SERPL-SCNC: 140 MMOL/L (ref 136–145)
TRIGL SERPL-MCNC: 184 MG/DL
TSH SERPL DL<=0.05 MIU/L-ACNC: 1.07 UIU/ML (ref 0.36–3.74)
VLDLC SERPL CALC-MCNC: 36.8 MG/DL
WBC # BLD AUTO: 7.9 K/UL (ref 4.1–11.1)

## 2023-11-13 ENCOUNTER — TELEPHONE (OUTPATIENT)
Age: 28
End: 2023-11-13

## 2024-11-17 ENCOUNTER — HOSPITAL ENCOUNTER (EMERGENCY)
Facility: HOSPITAL | Age: 29
Discharge: HOME | End: 2024-11-17
Attending: EMERGENCY MEDICINE
Payer: COMMERCIAL

## 2024-11-17 VITALS
TEMPERATURE: 98.8 F | WEIGHT: 250.44 LBS | BODY MASS INDEX: 35.06 KG/M2 | HEIGHT: 71 IN | RESPIRATION RATE: 18 BRPM | HEART RATE: 90 BPM | DIASTOLIC BLOOD PRESSURE: 90 MMHG | OXYGEN SATURATION: 99 % | SYSTOLIC BLOOD PRESSURE: 141 MMHG

## 2024-11-17 DIAGNOSIS — J40 BRONCHITIS: ICD-10-CM

## 2024-11-17 DIAGNOSIS — J06.9 URI WITH COUGH AND CONGESTION: Primary | ICD-10-CM

## 2024-11-17 PROCEDURE — 6360000002 HC RX W HCPCS: Performed by: EMERGENCY MEDICINE

## 2024-11-17 PROCEDURE — 96372 THER/PROPH/DIAG INJ SC/IM: CPT

## 2024-11-17 PROCEDURE — 99284 EMERGENCY DEPT VISIT MOD MDM: CPT

## 2024-11-17 PROCEDURE — 6370000000 HC RX 637 (ALT 250 FOR IP): Performed by: EMERGENCY MEDICINE

## 2024-11-17 RX ORDER — DEXAMETHASONE 4 MG/1
10 TABLET ORAL ONCE
Status: COMPLETED | OUTPATIENT
Start: 2024-11-17 | End: 2024-11-17

## 2024-11-17 RX ORDER — KETOROLAC TROMETHAMINE 30 MG/ML
30 INJECTION, SOLUTION INTRAMUSCULAR; INTRAVENOUS
Status: COMPLETED | OUTPATIENT
Start: 2024-11-17 | End: 2024-11-17

## 2024-11-17 RX ORDER — IBUPROFEN 600 MG/1
600 TABLET, FILM COATED ORAL EVERY 6 HOURS PRN
Qty: 20 TABLET | Refills: 1 | Status: SHIPPED | OUTPATIENT
Start: 2024-11-17 | End: 2024-11-27

## 2024-11-17 RX ORDER — CODEINE PHOSPHATE AND GUAIFENESIN 10; 100 MG/5ML; MG/5ML
10 SOLUTION ORAL ONCE
Status: COMPLETED | OUTPATIENT
Start: 2024-11-17 | End: 2024-11-17

## 2024-11-17 RX ADMIN — KETOROLAC TROMETHAMINE 30 MG: 30 INJECTION, SOLUTION INTRAMUSCULAR at 04:45

## 2024-11-17 RX ADMIN — GUAIFENESIN AND CODEINE PHOSPHATE 10 ML: 100; 10 SOLUTION ORAL at 04:43

## 2024-11-17 RX ADMIN — DEXAMETHASONE 10 MG: 4 TABLET ORAL at 04:44

## 2024-11-17 ASSESSMENT — PAIN SCALES - GENERAL: PAINLEVEL_OUTOF10: 6

## 2024-11-17 NOTE — ED PROVIDER NOTES
EMERGENCY DEPARTMENT HISTORY AND PHYSICAL EXAM    Date: 11/17/2024  Patient Name: Ezio Escoto  Patient Age and Sex: 29 y.o. male  MRN:  [id number]  CSN:  [id number]    History of Present Illness     Chief Complaint   Patient presents with    Cold Symptoms     Pt ambulatory to triage c/o of cold symptoms 2 weeks. Pt c/o of cough, chest congestion and sore throat. Denies fevers, chills. Pt took robitussin        History Provided By: Patient    Ability to gather history was limited by:     HPI: Ezio Escoto, 29 y.o. male   Complains of mild cough and cold symptoms for about 1-2 weeks.  Cough, chest congestion, sinus congestion, sore throat.  No significant fevers.      Tobacco Use      Smoking status: Every Day        Packs/day: 0.25        Years: 0.3 packs/day for 11.0 years (2.8 ttl pk-yrs)        Types: Cigarettes      Smokeless tobacco: Never     Past History   The patient's medical, surgical, and social history were reviewed by me today.    Current Medications:  No current facility-administered medications on file prior to encounter.     Current Outpatient Medications on File Prior to Encounter   Medication Sig Dispense Refill    betamethasone dipropionate 0.05 % lotion APPLY TO SCALP MIXED WITH COCONUT OIL EXTERNALLY EVERY OTHER DAY FOR 3 WEEKS THEN TWICE WEEKLY      desonide (DESOWEN) 0.05 % cream 1 Application      ketoconazole (NIZORAL) 2 % cream APPLY 1 APPLICATION EXTERNALLY TWICE A DAY TO FACE AND EARS FOR 30 DAYS THEN AS NEEDED FOR FLARES      predniSONE (DELTASONE) 10 MG tablet 3 tabs daily for 3 days, then 2 tabs daily for 3 days, then 1 tab daily for 3 days. 18 tablet 0    Ciclopirox 1 % SHAM APPLY TO SCALP LET SIT FOR 5 MINUTES THEN RINSE EXTERNALLY EVERY 2 WEEKS 30 DAYS      naproxen (NAPROSYN) 500 MG tablet Take 1 tablet by mouth 2 times daily as needed for Pain 30 tablet 0       Past Medical History:   Diagnosis Date    GERD (gastroesophageal reflux disease)     Ill-defined condition     scalp mass

## 2025-05-12 ENCOUNTER — OFFICE VISIT (OUTPATIENT)
Age: 30
End: 2025-05-12
Payer: COMMERCIAL

## 2025-05-12 VITALS
RESPIRATION RATE: 17 BRPM | HEIGHT: 71 IN | SYSTOLIC BLOOD PRESSURE: 116 MMHG | DIASTOLIC BLOOD PRESSURE: 74 MMHG | WEIGHT: 235.89 LBS | HEART RATE: 83 BPM | TEMPERATURE: 97.6 F | OXYGEN SATURATION: 97 % | BODY MASS INDEX: 33.02 KG/M2

## 2025-05-12 DIAGNOSIS — R73.03 PREDIABETES: ICD-10-CM

## 2025-05-12 DIAGNOSIS — E78.2 MIXED HYPERLIPIDEMIA: ICD-10-CM

## 2025-05-12 DIAGNOSIS — M54.50 BILATERAL LOW BACK PAIN WITHOUT SCIATICA, UNSPECIFIED CHRONICITY: ICD-10-CM

## 2025-05-12 DIAGNOSIS — Z00.00 ANNUAL PHYSICAL EXAM: Primary | ICD-10-CM

## 2025-05-12 DIAGNOSIS — Z00.00 ANNUAL PHYSICAL EXAM: ICD-10-CM

## 2025-05-12 PROCEDURE — 99395 PREV VISIT EST AGE 18-39: CPT | Performed by: STUDENT IN AN ORGANIZED HEALTH CARE EDUCATION/TRAINING PROGRAM

## 2025-05-12 RX ORDER — CLOBETASOL PROPIONATE 0.5 MG/ML
SOLUTION TOPICAL 2 TIMES DAILY
COMMUNITY
Start: 2025-04-17

## 2025-05-12 SDOH — ECONOMIC STABILITY: FOOD INSECURITY: WITHIN THE PAST 12 MONTHS, THE FOOD YOU BOUGHT JUST DIDN'T LAST AND YOU DIDN'T HAVE MONEY TO GET MORE.: NEVER TRUE

## 2025-05-12 SDOH — ECONOMIC STABILITY: FOOD INSECURITY: WITHIN THE PAST 12 MONTHS, YOU WORRIED THAT YOUR FOOD WOULD RUN OUT BEFORE YOU GOT MONEY TO BUY MORE.: NEVER TRUE

## 2025-05-12 ASSESSMENT — PATIENT HEALTH QUESTIONNAIRE - PHQ9
SUM OF ALL RESPONSES TO PHQ QUESTIONS 1-9: 0
1. LITTLE INTEREST OR PLEASURE IN DOING THINGS: NOT AT ALL
2. FEELING DOWN, DEPRESSED OR HOPELESS: NOT AT ALL
SUM OF ALL RESPONSES TO PHQ QUESTIONS 1-9: 0

## 2025-05-12 NOTE — PROGRESS NOTES
[facility]  [address] [place] [address] [phone]    C/C: Complete physical    Subjective:    Ezio Escoto is a 29 y.o. male who presents to clinic today for annual physical exam.      Former patient of Dr. Franco.    Acute/chronic Concerns include:   Back pain, chronic:  Patient presents for evaluation of low back problems  This is a chronic problem for about 3 months.  Involves bilateral LBP and does not radiate down the leg.  Patient is worst with certain activities such as bending and twisting.  Better with rest.     No red flag symptoms including saddle anesthesia, incontinence, weakness, numbness, or tingling. The patient denies fevers, chills, abd pain or sweats.     Patient denies any chest pain, sob, headache, dizziness, fever, chills or abdominal pain.    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     Allergies- reviewed:   No Known Allergies    Medications- reviewed:   Current Outpatient Medications   Medication Sig    clobetasol (TEMOVATE) 0.05 % external solution Apply topically 2 times daily    betamethasone dipropionate 0.05 % lotion APPLY TO SCALP MIXED WITH COCONUT OIL EXTERNALLY EVERY OTHER DAY FOR 3 WEEKS THEN TWICE WEEKLY    desonide (DESOWEN) 0.05 % cream 1 Application    ketoconazole (NIZORAL) 2 % cream APPLY 1 APPLICATION EXTERNALLY TWICE A DAY TO FACE AND EARS FOR 30 DAYS

## 2025-05-12 NOTE — ASSESSMENT & PLAN NOTE
We did discuss that we could continue to seek out nonoperative modalities, such as: NSAIDs, oral and topical analgesics, joint injections, physical therapy, stretching, strengthening, and activity modification.  The patient stated their understanding with this and would like to proceed with nonsurgical management;  -HOME GUIDED THERAPY AS DISCUSSED

## 2025-05-12 NOTE — PROGRESS NOTES
Chief Complaint   Patient presents with    Establish Care     Former Patient of Dr. Franco    Check-Up    Lower Back Pain     X 2-3 months     Denies any injury. On & off discomfort. Pain scale 4 out of 10.    \"Have you been to the ER, urgent care clinic since your last visit?  Hospitalized since your last visit?\"    NO    “Have you seen or consulted any other health care providers outside of [facility] since your last visit?”      Yes Dermatology 10/2024 Dermatitis Once a year       Vitals:    25 1532   BP: 116/74   Pulse: 83   Resp: 17   Temp: 97.6 °F (36.4 °C)   TempSrc: Temporal   SpO2: 97%   Weight: 107 kg (235 lb 14.3 oz)   Height: 1.803 m (5' 11\")      Health Maintenance Due   Topic Date Due    Depression Screen  Never done    Varicella vaccine (1 of 2 - 13+ 2-dose series) Never done    Hepatitis B vaccine (1 of 3 - 19+ 3-dose series) Never done    Pneumococcal 0-49 years Vaccine (1 of 2 - PCV) Never done    COVID-19 Vaccine (2024- season) Never done    A1C test (Diabetic or Prediabetic)  10/30/2024      The patient, Ezio Escoto, identity was verified by name and , pharmacy verified  Labs:Yes  Fasting:No

## 2025-05-12 NOTE — ASSESSMENT & PLAN NOTE
Discussed  importance of varied diet and minimizing junk food, physical activity, limiting screen time, regular dental care, seat belts/ sports protective gear/ helmet safety/ swimming safety, sunscreen, safe storage of any firearms in the home, healthy sexual awareness/relationships,  tobacco, alcohol and drug dangers

## 2025-05-13 ENCOUNTER — RESULTS FOLLOW-UP (OUTPATIENT)
Age: 30
End: 2025-05-13

## 2025-05-13 LAB
ANION GAP SERPL CALC-SCNC: 4 MMOL/L (ref 2–12)
BUN SERPL-MCNC: 16 MG/DL (ref 6–20)
BUN/CREAT SERPL: 15 (ref 12–20)
CALCIUM SERPL-MCNC: 9.4 MG/DL (ref 8.5–10.1)
CHLORIDE SERPL-SCNC: 108 MMOL/L (ref 97–108)
CHOLEST SERPL-MCNC: 104 MG/DL
CO2 SERPL-SCNC: 28 MMOL/L (ref 21–32)
CREAT SERPL-MCNC: 1.07 MG/DL (ref 0.7–1.3)
EST. AVERAGE GLUCOSE BLD GHB EST-MCNC: 108 MG/DL
GLUCOSE SERPL-MCNC: 85 MG/DL (ref 65–100)
HBA1C MFR BLD: 5.4 % (ref 4–5.6)
HDLC SERPL-MCNC: 46 MG/DL
HDLC SERPL: 2.3 (ref 0–5)
LDLC SERPL CALC-MCNC: 47 MG/DL (ref 0–100)
POTASSIUM SERPL-SCNC: 4.3 MMOL/L (ref 3.5–5.1)
SODIUM SERPL-SCNC: 140 MMOL/L (ref 136–145)
TRIGL SERPL-MCNC: 55 MG/DL
VLDLC SERPL CALC-MCNC: 11 MG/DL

## 2025-06-11 PROBLEM — Z00.00 ANNUAL PHYSICAL EXAM: Status: RESOLVED | Noted: 2025-05-12 | Resolved: 2025-06-11

## (undated) DEVICE — HYPODERMIC SAFETY NEEDLE: Brand: MAGELLAN

## (undated) DEVICE — SUTURE MCRYL SZ 4-0 L27IN ABSRB UD L19MM PS-2 1/2 CIR PRIM Y426H

## (undated) DEVICE — MAJOR LAPAROTOMY-MRMC: Brand: MEDLINE INDUSTRIES, INC.

## (undated) DEVICE — SOLUTION IRRIG 1000ML 0.9% SOD CHL USP POUR PLAS BTL

## (undated) DEVICE — INTENDED FOR TISSUE SEPARATION, AND OTHER PROCEDURES THAT REQUIRE A SHARP SURGICAL BLADE TO PUNCTURE OR CUT.: Brand: BARD-PARKER ® CARBON RIB-BACK BLADES

## (undated) DEVICE — REM POLYHESIVE ADULT PATIENT RETURN ELECTRODE: Brand: VALLEYLAB

## (undated) DEVICE — Device

## (undated) DEVICE — GLOVE SURG SZ 7.5 L11.2IN THK9.8MIL STRW LTX POLYMER BEAD

## (undated) DEVICE — ADHESIVE SKIN CLSR 0.7ML TOP DERMBND ADV

## (undated) DEVICE — 4-PORT MANIFOLD: Brand: NEPTUNE 2